# Patient Record
Sex: MALE | Race: WHITE | NOT HISPANIC OR LATINO
[De-identification: names, ages, dates, MRNs, and addresses within clinical notes are randomized per-mention and may not be internally consistent; named-entity substitution may affect disease eponyms.]

---

## 2017-12-28 ENCOUNTER — RX RENEWAL (OUTPATIENT)
Age: 82
End: 2017-12-28

## 2018-04-03 ENCOUNTER — LABORATORY RESULT (OUTPATIENT)
Age: 83
End: 2018-04-03

## 2018-04-04 ENCOUNTER — APPOINTMENT (OUTPATIENT)
Dept: CARDIOLOGY | Facility: CLINIC | Age: 83
End: 2018-04-04
Payer: MEDICARE

## 2018-04-04 ENCOUNTER — NON-APPOINTMENT (OUTPATIENT)
Age: 83
End: 2018-04-04

## 2018-04-04 VITALS
WEIGHT: 174 LBS | RESPIRATION RATE: 16 BRPM | BODY MASS INDEX: 27.97 KG/M2 | HEIGHT: 66 IN | DIASTOLIC BLOOD PRESSURE: 86 MMHG | SYSTOLIC BLOOD PRESSURE: 135 MMHG | HEART RATE: 58 BPM

## 2018-04-04 PROCEDURE — 93306 TTE W/DOPPLER COMPLETE: CPT

## 2018-04-04 PROCEDURE — 93000 ELECTROCARDIOGRAM COMPLETE: CPT

## 2018-04-04 PROCEDURE — 99214 OFFICE O/P EST MOD 30 MIN: CPT

## 2018-04-24 ENCOUNTER — RX RENEWAL (OUTPATIENT)
Age: 83
End: 2018-04-24

## 2018-05-02 ENCOUNTER — APPOINTMENT (OUTPATIENT)
Dept: ELECTROPHYSIOLOGY | Facility: CLINIC | Age: 83
End: 2018-05-02
Payer: MEDICARE

## 2018-05-02 ENCOUNTER — NON-APPOINTMENT (OUTPATIENT)
Age: 83
End: 2018-05-02

## 2018-05-02 VITALS — DIASTOLIC BLOOD PRESSURE: 71 MMHG | SYSTOLIC BLOOD PRESSURE: 150 MMHG | OXYGEN SATURATION: 98 % | HEART RATE: 48 BPM

## 2018-05-02 PROCEDURE — 99215 OFFICE O/P EST HI 40 MIN: CPT

## 2018-05-02 PROCEDURE — 93000 ELECTROCARDIOGRAM COMPLETE: CPT

## 2018-05-02 RX ORDER — RIVAROXABAN 15 MG/1
15 TABLET, FILM COATED ORAL
Qty: 90 | Refills: 1 | Status: DISCONTINUED | COMMUNITY
Start: 2017-12-28 | End: 2018-05-02

## 2018-05-07 ENCOUNTER — RX RENEWAL (OUTPATIENT)
Age: 83
End: 2018-05-07

## 2018-06-20 ENCOUNTER — RX RENEWAL (OUTPATIENT)
Age: 83
End: 2018-06-20

## 2018-07-03 ENCOUNTER — RX RENEWAL (OUTPATIENT)
Age: 83
End: 2018-07-03

## 2018-07-13 ENCOUNTER — APPOINTMENT (OUTPATIENT)
Dept: CARDIOLOGY | Facility: CLINIC | Age: 83
End: 2018-07-13
Payer: MEDICARE

## 2018-07-13 VITALS
HEART RATE: 66 BPM | BODY MASS INDEX: 27 KG/M2 | WEIGHT: 168 LBS | SYSTOLIC BLOOD PRESSURE: 126 MMHG | HEIGHT: 66 IN | RESPIRATION RATE: 16 BRPM | DIASTOLIC BLOOD PRESSURE: 79 MMHG

## 2018-07-13 PROCEDURE — 99214 OFFICE O/P EST MOD 30 MIN: CPT

## 2018-07-30 ENCOUNTER — APPOINTMENT (OUTPATIENT)
Dept: CARDIOLOGY | Facility: CLINIC | Age: 83
End: 2018-07-30

## 2018-07-30 ENCOUNTER — APPOINTMENT (OUTPATIENT)
Dept: CARDIOLOGY | Facility: CLINIC | Age: 83
End: 2018-07-30
Payer: MEDICARE

## 2018-07-30 PROCEDURE — 93224 XTRNL ECG REC UP TO 48 HRS: CPT

## 2018-08-03 ENCOUNTER — NON-APPOINTMENT (OUTPATIENT)
Age: 83
End: 2018-08-03

## 2018-08-20 ENCOUNTER — EMERGENCY (EMERGENCY)
Facility: HOSPITAL | Age: 83
LOS: 1 days | Discharge: HOME | End: 2018-08-20
Attending: EMERGENCY MEDICINE
Payer: MEDICARE

## 2018-08-20 VITALS
OXYGEN SATURATION: 97 % | HEART RATE: 63 BPM | SYSTOLIC BLOOD PRESSURE: 150 MMHG | RESPIRATION RATE: 15 BRPM | DIASTOLIC BLOOD PRESSURE: 61 MMHG | TEMPERATURE: 98 F

## 2018-08-20 VITALS
RESPIRATION RATE: 16 BRPM | HEIGHT: 68 IN | DIASTOLIC BLOOD PRESSURE: 76 MMHG | TEMPERATURE: 100 F | OXYGEN SATURATION: 94 % | HEART RATE: 56 BPM | SYSTOLIC BLOOD PRESSURE: 154 MMHG | WEIGHT: 171.96 LBS

## 2018-08-20 DIAGNOSIS — Z98.89 OTHER SPECIFIED POSTPROCEDURAL STATES: Chronic | ICD-10-CM

## 2018-08-20 LAB
ALBUMIN SERPL ELPH-MCNC: 3 G/DL — LOW (ref 3.3–5)
ALP SERPL-CCNC: 82 U/L — SIGNIFICANT CHANGE UP (ref 40–120)
ALT FLD-CCNC: 31 U/L — SIGNIFICANT CHANGE UP (ref 12–78)
ANION GAP SERPL CALC-SCNC: 8 MMOL/L — SIGNIFICANT CHANGE UP (ref 5–17)
APPEARANCE UR: CLEAR — SIGNIFICANT CHANGE UP
AST SERPL-CCNC: 30 U/L — SIGNIFICANT CHANGE UP (ref 15–37)
BACTERIA # UR AUTO: ABNORMAL
BASOPHILS # BLD AUTO: 0 K/UL — SIGNIFICANT CHANGE UP (ref 0–0.2)
BASOPHILS NFR BLD AUTO: 0 % — SIGNIFICANT CHANGE UP (ref 0–2)
BILIRUB SERPL-MCNC: 0.4 MG/DL — SIGNIFICANT CHANGE UP (ref 0.2–1.2)
BILIRUB UR-MCNC: NEGATIVE — SIGNIFICANT CHANGE UP
BUN SERPL-MCNC: 47 MG/DL — HIGH (ref 7–23)
CALCIUM SERPL-MCNC: 8.4 MG/DL — LOW (ref 8.5–10.1)
CHLORIDE SERPL-SCNC: 107 MMOL/L — SIGNIFICANT CHANGE UP (ref 96–108)
CK MB BLD-MCNC: 1.2 % — SIGNIFICANT CHANGE UP (ref 0–3.5)
CK MB BLD-MCNC: 1.8 % — SIGNIFICANT CHANGE UP (ref 0–3.5)
CK MB CFR SERPL CALC: 2 NG/ML — SIGNIFICANT CHANGE UP (ref 0–3.6)
CK MB CFR SERPL CALC: 2.4 NG/ML — SIGNIFICANT CHANGE UP (ref 0–3.6)
CK SERPL-CCNC: 134 U/L — SIGNIFICANT CHANGE UP (ref 26–308)
CK SERPL-CCNC: 170 U/L — SIGNIFICANT CHANGE UP (ref 26–308)
CO2 SERPL-SCNC: 25 MMOL/L — SIGNIFICANT CHANGE UP (ref 22–31)
COLOR SPEC: YELLOW — SIGNIFICANT CHANGE UP
CREAT SERPL-MCNC: 2.5 MG/DL — HIGH (ref 0.5–1.3)
DIFF PNL FLD: ABNORMAL
EOSINOPHIL # BLD AUTO: 0 K/UL — SIGNIFICANT CHANGE UP (ref 0–0.5)
EOSINOPHIL NFR BLD AUTO: 0 % — SIGNIFICANT CHANGE UP (ref 0–6)
EPI CELLS # UR: SIGNIFICANT CHANGE UP
GLUCOSE SERPL-MCNC: 165 MG/DL — HIGH (ref 70–99)
GLUCOSE UR QL: NEGATIVE — SIGNIFICANT CHANGE UP
HCT VFR BLD CALC: 28.2 % — LOW (ref 39–50)
HGB BLD-MCNC: 9.5 G/DL — LOW (ref 13–17)
KETONES UR-MCNC: NEGATIVE — SIGNIFICANT CHANGE UP
LACTATE SERPL-SCNC: 0.9 MMOL/L — SIGNIFICANT CHANGE UP (ref 0.7–2)
LEUKOCYTE ESTERASE UR-ACNC: ABNORMAL
LYMPHOCYTES # BLD AUTO: 2.05 K/UL — SIGNIFICANT CHANGE UP (ref 1–3.3)
LYMPHOCYTES # BLD AUTO: 30 % — SIGNIFICANT CHANGE UP (ref 13–44)
MANUAL SMEAR VERIFICATION: SIGNIFICANT CHANGE UP
MCHC RBC-ENTMCNC: 28.4 PG — SIGNIFICANT CHANGE UP (ref 27–34)
MCHC RBC-ENTMCNC: 33.7 GM/DL — SIGNIFICANT CHANGE UP (ref 32–36)
MCV RBC AUTO: 84.2 FL — SIGNIFICANT CHANGE UP (ref 80–100)
MONOCYTES # BLD AUTO: 0.07 K/UL — SIGNIFICANT CHANGE UP (ref 0–0.9)
MONOCYTES NFR BLD AUTO: 1 % — LOW (ref 2–14)
NEUTROPHILS # BLD AUTO: 4.72 K/UL — SIGNIFICANT CHANGE UP (ref 1.8–7.4)
NEUTROPHILS NFR BLD AUTO: 66 % — SIGNIFICANT CHANGE UP (ref 43–77)
NEUTS BAND # BLD: 3 % — SIGNIFICANT CHANGE UP (ref 0–8)
NITRITE UR-MCNC: NEGATIVE — SIGNIFICANT CHANGE UP
NRBC # BLD: 0 — SIGNIFICANT CHANGE UP
NRBC # BLD: SIGNIFICANT CHANGE UP /100 WBCS (ref 0–0)
PH UR: 6 — SIGNIFICANT CHANGE UP (ref 5–8)
PLAT MORPH BLD: NORMAL — SIGNIFICANT CHANGE UP
PLATELET # BLD AUTO: 146 K/UL — LOW (ref 150–400)
POTASSIUM SERPL-MCNC: 4.9 MMOL/L — SIGNIFICANT CHANGE UP (ref 3.5–5.3)
POTASSIUM SERPL-SCNC: 4.9 MMOL/L — SIGNIFICANT CHANGE UP (ref 3.5–5.3)
PROT SERPL-MCNC: 6.8 G/DL — SIGNIFICANT CHANGE UP (ref 6–8.3)
PROT UR-MCNC: 75 MG/DL
RAPID RVP RESULT: SIGNIFICANT CHANGE UP
RBC # BLD: 3.35 M/UL — LOW (ref 4.2–5.8)
RBC # FLD: 14.6 % — HIGH (ref 10.3–14.5)
RBC BLD AUTO: SIGNIFICANT CHANGE UP
RBC CASTS # UR COMP ASSIST: SIGNIFICANT CHANGE UP /HPF (ref 0–4)
SODIUM SERPL-SCNC: 140 MMOL/L — SIGNIFICANT CHANGE UP (ref 135–145)
SP GR SPEC: 1 — LOW (ref 1.01–1.02)
TROPONIN I SERPL-MCNC: 0.02 NG/ML — SIGNIFICANT CHANGE UP (ref 0.01–0.04)
TROPONIN I SERPL-MCNC: <.015 NG/ML — SIGNIFICANT CHANGE UP (ref 0.01–0.04)
UROBILINOGEN FLD QL: NEGATIVE — SIGNIFICANT CHANGE UP
WBC # BLD: 6.84 K/UL — SIGNIFICANT CHANGE UP (ref 3.8–10.5)
WBC # FLD AUTO: 6.84 K/UL — SIGNIFICANT CHANGE UP (ref 3.8–10.5)
WBC UR QL: SIGNIFICANT CHANGE UP

## 2018-08-20 PROCEDURE — 99285 EMERGENCY DEPT VISIT HI MDM: CPT

## 2018-08-20 PROCEDURE — 99285 EMERGENCY DEPT VISIT HI MDM: CPT | Mod: 25

## 2018-08-20 PROCEDURE — 71045 X-RAY EXAM CHEST 1 VIEW: CPT | Mod: 26

## 2018-08-20 RX ORDER — SODIUM CHLORIDE 9 MG/ML
1000 INJECTION INTRAMUSCULAR; INTRAVENOUS; SUBCUTANEOUS ONCE
Qty: 0 | Refills: 0 | Status: COMPLETED | OUTPATIENT
Start: 2018-08-20 | End: 2018-08-20

## 2018-08-20 RX ADMIN — SODIUM CHLORIDE 1000 MILLILITER(S): 9 INJECTION INTRAMUSCULAR; INTRAVENOUS; SUBCUTANEOUS at 01:58

## 2018-08-20 NOTE — ED ADULT NURSE REASSESSMENT NOTE - NS ED NURSE REASSESS COMMENT FT1
pt verbalizes no pain at present CE drawned and sent to lab vital signs stable awaiting consult and lab result

## 2018-08-20 NOTE — CONSULT NOTE ADULT - ASSESSMENT
Patient is a 82y old  Male who presents with a chief complaint of chest discomfort     Doubt ACS, pain likely pleuritic vs musculoskeletal.    - No clear evidence of acute ischemia, trops negative x 2. will f/u third set, pt asymptomatic  - No evidence of volume overload  - No acute changes on EKG compared to previous. 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

## 2018-08-20 NOTE — ED ADULT NURSE NOTE - OBJECTIVE STATEMENT
received pt stable c/o chest disconfort  pt evaluted and placed on cardiac monitor ekg  done and reviewed blood work drawned and sent to lab xray done pt took asa at home

## 2018-08-20 NOTE — ED PROVIDER NOTE - CPE EDP GASTRO NORM
Group Topic: Monitoring Safety and Relapse    Start Time: 5:30 PM  End Time: 6:30 PM    Number in attendance: 6       Safety Concerns: None  Types of Safety Concerns: None         normal...

## 2018-08-20 NOTE — ED ADULT NURSE NOTE - PMH
ASHD (Arteriosclerotic Heart Disease)    BPH (Benign Prostatic Hypertrophy)    Diabetes Mellitus Type II    GERD (Gastroesophageal Reflux Disease)    History of Hypothyroidism    History of Spinal Stenosis    Hypercholesterolemia    BECK on CPAP    Osteoarthritis    Prostate cancer

## 2018-08-20 NOTE — ED PROVIDER NOTE - OBJECTIVE STATEMENT
83yo male bib ems with chest pain tonite. pt was sitting in bed and had sudden onset of sharp, left sided chest pain, intermittent, non radiating, no sob, no cough, fever, chills, pt has been on ceftin for a week for a UTI. pt is chest pain free at this time

## 2018-08-20 NOTE — CONSULT NOTE ADULT - SUBJECTIVE AND OBJECTIVE BOX
Patient is a 82y old  Male who presents with a chief complaint of chest discomfort     HPI: 81 yo M with pmh of ASHD with 2 stent placement (last ), HLD, DM II, BECK, prostate CA, hypothyroid, spinal stenosis presents with left sided chest pain at 11pm last night. The pain was localized to his left chest and arm without any radiation. Patient denies any excessive activity or trauma at symptom onset. No history of similar pain in past.  Pain was intermittent and intitally started when patient was lying in bed trying to sleep lasting for about 2 minutes each episode until it completley resolved 3 hours later. Denies any current chest pain, palpitations, diaphoresis, left arm pain, syncope, near syncope. Patient follows cardiology Dr. Kenton Rodriguez who he last saw 2 weeks ago for frequent falls. He was placed on a holter monitior which has been negative per patients wife. Patient reports having a echo and stress test done in the past but does not recall when or where. Of note, patient was recently admitted to [facility] on 2018 for frequent falls and leg weakness. He was placed on telemetry and found to have 1st degree AV block and sinus césar. He was seen by cardiology and neuro and had MRI of spine done which revealed DJD and spinal stenosis, for which patient is being followed as outpatient. Patient is able to walk 1/2 a city block at baseline.      PAST MEDICAL & SURGICAL HISTORY:  BECK on CPAP  Prostate cancer  History of Hypothyroidism  GERD (Gastroesophageal Reflux Disease)  Osteoarthritis  BPH (Benign Prostatic Hypertrophy)  ASHD (Arteriosclerotic Heart Disease)  Diabetes Mellitus Type II  History of Spinal Stenosis  Hypercholesterolemia  S/P tonsillectomy  Central Spinal Stenosis: x stop procedure done  S/P Cataract Surgery: b/l eyes            ECHO  FINDINGS:    Home Medications:  amLODIPine 5 mg oral tablet: 1 tab(s) orally once a day (20 Aug 2018 00:49)  Aspirin Low Dose 81 mg oral delayed release tablet: 1 tab(s) orally once a day (20 Aug 2018 00:49)  cefpodoxime 200 mg oral tablet: 1 tab(s) orally every 12 hours (20 Aug 2018 00:49)  Crestor 20 mg oral tablet: 1 tab(s) orally once a day (at bedtime) (20 Aug 2018 00:49)  donepezil 10 mg oral tablet: 1 tab(s) orally once a day (at bedtime) (20 Aug 2018 00:49)  entergam: 5 milligram(s) orally once a day (20 Aug 2018 00:49)  famotidine 20 mg oral tablet: 1 tab(s) orally once a day (20 Aug 2018 00:49)  ferrous sulfate: 65 milligram(s) orally once a day (20 Aug 2018 00:49)  irbesartan 75 mg oral tablet: 1 tab(s) orally once a day (20 Aug 2018 00:49)  Lantus 100 units/mL subcutaneous solution: 18 unit(s) subcutaneous (20 Aug 2018 00:49)  memantine 10 mg oral tablet: 1 tab(s) orally 2 times a day (20 Aug 2018 00:49)  metoprolol: 12.5 milligram(s) orally 2 times a day (20 Aug 2018 00:49)  multivitamin:  (20 Aug 2018 00:49)  Rapaflo 8 mg oral capsule: 1 cap(s) orally once a day (20 Aug 2018 00:49)  repaglinide 2 mg oral tablet: 1 tab(s) orally 2 times a day (20 Aug 2018 00:49)  sodium bicarbonate 650 mg oral tablet: 1 tab(s) orally 2 times a day (20 Aug 2018 00:49)  Synthroid 100 mcg (0.1 mg) oral tablet: 1 tab(s) orally once a day (20 Aug 2018 00:49)  traZODone: 25 milligram(s) orally once a day (20 Aug 2018 00:49)  venlafaxine 75 mg oral capsule, extended release: 1 cap(s) orally once a day (20 Aug 2018 00:49)  VESIcare 5 mg oral tablet: 1 tab(s) orally once a day (20 Aug 2018 00:49)  vitamin d: 1000 international unit(s) orally once a day (20 Aug 2018 00:49)  Xarelto 15 mg oral tablet: 1 tab(s) orally once a day (in the evening) (20 Aug 2018 00:49)    MEDICATIONS  (STANDING):    MEDICATIONS  (PRN):      FAMILY HISTORY:  Family history of diabetes mellitus type II (Sibling)  Family history of heart attack father  Family history of CAD sister      REVIEW OF SYSTEMS      Constitutional: admits memory loss, denies fever, chills, diaphoresis   HEENT: denies blurry vision, difficulty hearing  Respiratory: denies SOB, AMBROSE, cough, sputum production, wheezing  Cardiovascular: denies chest pain, palpitations, SOB, dyspnea, PND, orthopnea, near syncope, syncope, or lower extremity edema.  Gastrointestinal: denies nausea, vomiting, diarrhea, constipation, abdominal pain   Genitourinary: denies dysuria, frequency, urgency, hematuria   Skin/Breast: denies rash, itching  Musculoskeletal: admits joint pain  Neurologic: denies headache, weakness, dizziness  Hematology/Oncology: denies bruising, tender or enlarged lymph nodes   ROS negative except as noted above    Allergic/Immunologic:	  Allergies    No Known Allergies    Intolerances      SOCIAL HISTORY:  Denies tobacco, drug use. Social alcohol use    Vital Signs Last 24 Hrs  T(C): 37.1 (20 Aug 2018 06:14), Max: 37.8 (20 Aug 2018 00:35)  T(F): 98.7 (20 Aug 2018 06:14), Max: 100.1 (20 Aug 2018 00:35)  HR: 60 (20 Aug 2018 06:14) (56 - 60)  BP: 150/74 (20 Aug 2018 06:14) (145/74 - 154/76)  BP(mean): --  RR: 14 (20 Aug 2018 06:14) (14 - 16)  SpO2: 96% (20 Aug 2018 06:14) (94% - 97%)    PHYSICAL EXAM:      Constitutional:  Physical Exam:  General:NAD, lying comfortably in bed  HEENT: NCAT, PERRLA, EOMI bl, dry mucous membranes   Neurology: A&Ox3, nonfocal, CN II-XII grossly intact, sensation intact  Respiratory: CTA B/L, No W/R/R  CV: RRR, +S1/S2, no murmurs, rubs or gallops  Abdominal: Soft, NT, ND +BSx4  Extremities: No C/C/E, + peripheral pulses  MSK: Normal ROM, no joint erythema or warmth, no joint swelling   Skin: warm, dry, normal color    ECG:    I&O's Detail      LABS:                        9.5    6.84  )-----------( 146      ( 20 Aug 2018 01:05 )             28.2     08-20    140  |  107  |  47<H>  ----------------------------<  165<H>  4.9   |  25  |  2.50<H>    Ca    8.4<L>      20 Aug 2018 01:05    TPro  6.8  /  Alb  3.0<L>  /  TBili  0.4  /  DBili  x   /  AST  30  /  ALT  31  /  AlkPhos  82  08-20    CARDIAC MARKERS ( 20 Aug 2018 06:12 )  .019 ng/mL / x     / 134 U/L / x     / 2.4 ng/mL  CARDIAC MARKERS ( 20 Aug 2018 01:05 )  <.015 ng/mL / x     / 170 U/L / x     / 2.0 ng/mL        Urinalysis Basic - ( 20 Aug 2018 01:37 )    Color: Yellow / Appearance: Clear / S.005 / pH: x  Gluc: x / Ketone: Negative  / Bili: Negative / Urobili: Negative   Blood: x / Protein: 75 mg/dL / Nitrite: Negative   Leuk Esterase: Trace / RBC: 0-2 /HPF / WBC 0-2   Sq Epi: x / Non Sq Epi: Occasional / Bacteria: Occasional      I&O's Summary    BNP  RADIOLOGY & ADDITIONAL STUDIES: Patient is a 82y old  Male who presents with a chief complaint of chest discomfort     HPI: 81 yo M with pmh of ASHD with 2 stent placement (last ), Afib on Xarelto. HLD, DM II, BECK, prostate CA, hypothyroid, spinal stenosis presents with left sided chest pain at 11pm last night. The pain was localized to his left chest and arm without any radiation. Patient denies any excessive activity or trauma at symptom onset. No history of similar pain in past.  Pain was intermittent and intitally started when patient was lying in bed trying to sleep lasting for about 2 minutes each episode until it completley resolved 3 hours later. Denies any current chest pain, palpitations, diaphoresis, left arm pain, syncope, near syncope. Patient follows cardiology Dr. Kenton Rodriguez who he last saw 2 weeks ago for frequent falls. He was placed on a holter monitior which has been negative per patients wife. Patient reports having a echo and stress test done in the past but does not recall when or where. Of note, patient was recently admitted to [facility] on 2018 for frequent falls and leg weakness. He was placed on telemetry and found to have 1st degree AV block and sinus césar. He was seen by cardiology and neuro and had MRI of spine done which revealed DJD and spinal stenosis, for which patient is being followed as outpatient. Patient is able to walk 1/2 a city block at baseline.      PAST MEDICAL & SURGICAL HISTORY:  BECK on CPAP  Prostate cancer  History of Hypothyroidism  GERD (Gastroesophageal Reflux Disease)  Osteoarthritis  BPH (Benign Prostatic Hypertrophy)  ASHD (Arteriosclerotic Heart Disease)  Diabetes Mellitus Type II  History of Spinal Stenosis  Hypercholesterolemia  S/P tonsillectomy  Central Spinal Stenosis: x stop procedure done  S/P Cataract Surgery: b/l eyes            ECHO  FINDINGS:    Home Medications:  amLODIPine 5 mg oral tablet: 1 tab(s) orally once a day (20 Aug 2018 00:49)  Aspirin Low Dose 81 mg oral delayed release tablet: 1 tab(s) orally once a day (20 Aug 2018 00:49)  cefpodoxime 200 mg oral tablet: 1 tab(s) orally every 12 hours (20 Aug 2018 00:49)  Crestor 20 mg oral tablet: 1 tab(s) orally once a day (at bedtime) (20 Aug 2018 00:49)  donepezil 10 mg oral tablet: 1 tab(s) orally once a day (at bedtime) (20 Aug 2018 00:49)  entergam: 5 milligram(s) orally once a day (20 Aug 2018 00:49)  famotidine 20 mg oral tablet: 1 tab(s) orally once a day (20 Aug 2018 00:49)  ferrous sulfate: 65 milligram(s) orally once a day (20 Aug 2018 00:49)  irbesartan 75 mg oral tablet: 1 tab(s) orally once a day (20 Aug 2018 00:49)  Lantus 100 units/mL subcutaneous solution: 18 unit(s) subcutaneous (20 Aug 2018 00:49)  memantine 10 mg oral tablet: 1 tab(s) orally 2 times a day (20 Aug 2018 00:49)  metoprolol: 12.5 milligram(s) orally 2 times a day (20 Aug 2018 00:49)  multivitamin:  (20 Aug 2018 00:49)  Rapaflo 8 mg oral capsule: 1 cap(s) orally once a day (20 Aug 2018 00:49)  repaglinide 2 mg oral tablet: 1 tab(s) orally 2 times a day (20 Aug 2018 00:49)  sodium bicarbonate 650 mg oral tablet: 1 tab(s) orally 2 times a day (20 Aug 2018 00:49)  Synthroid 100 mcg (0.1 mg) oral tablet: 1 tab(s) orally once a day (20 Aug 2018 00:49)  traZODone: 25 milligram(s) orally once a day (20 Aug 2018 00:49)  venlafaxine 75 mg oral capsule, extended release: 1 cap(s) orally once a day (20 Aug 2018 00:49)  VESIcare 5 mg oral tablet: 1 tab(s) orally once a day (20 Aug 2018 00:49)  vitamin d: 1000 international unit(s) orally once a day (20 Aug 2018 00:49)  Xarelto 15 mg oral tablet: 1 tab(s) orally once a day (in the evening) (20 Aug 2018 00:49)    MEDICATIONS  (STANDING):    MEDICATIONS  (PRN):      FAMILY HISTORY:  Family history of diabetes mellitus type II (Sibling)  Family history of heart attack father  Family history of CAD sister      REVIEW OF SYSTEMS      Constitutional: admits memory loss, denies fever, chills, diaphoresis   HEENT: denies blurry vision, difficulty hearing  Respiratory: denies SOB, AMBROSE, cough, sputum production, wheezing  Cardiovascular: denies chest pain, palpitations, SOB, dyspnea, PND, orthopnea, near syncope, syncope, or lower extremity edema.  Gastrointestinal: denies nausea, vomiting, diarrhea, constipation, abdominal pain   Genitourinary: denies dysuria, frequency, urgency, hematuria   Skin/Breast: denies rash, itching  Musculoskeletal: admits joint pain  Neurologic: denies headache, weakness, dizziness  Hematology/Oncology: denies bruising, tender or enlarged lymph nodes   ROS negative except as noted above    Allergic/Immunologic:	  Allergies    No Known Allergies    Intolerances      SOCIAL HISTORY:  Denies tobacco, drug use. Social alcohol use    Vital Signs Last 24 Hrs  T(C): 37.1 (20 Aug 2018 06:14), Max: 37.8 (20 Aug 2018 00:35)  T(F): 98.7 (20 Aug 2018 06:14), Max: 100.1 (20 Aug 2018 00:35)  HR: 60 (20 Aug 2018 06:14) (56 - 60)  BP: 150/74 (20 Aug 2018 06:14) (145/74 - 154/76)  BP(mean): --  RR: 14 (20 Aug 2018 06:14) (14 - 16)  SpO2: 96% (20 Aug 2018 06:14) (94% - 97%)    PHYSICAL EXAM:      Constitutional:  Physical Exam:  General:NAD, lying comfortably in bed  HEENT: NCAT, PERRLA, EOMI bl, dry mucous membranes   Neurology: A&Ox3, nonfocal, CN II-XII grossly intact, sensation intact  Respiratory: CTA B/L, No W/R/R  CV: RRR, +S1/S2, no murmurs, rubs or gallops  Abdominal: Soft, NT, ND +BSx4  Extremities: No C/C/E, + peripheral pulses  MSK: Normal ROM, no joint erythema or warmth, no joint swelling   Skin: warm, dry, normal color    ECG:    I&O's Detail      LABS:                        9.5    6.84  )-----------( 146      ( 20 Aug 2018 01:05 )             28.2     08-20    140  |  107  |  47<H>  ----------------------------<  165<H>  4.9   |  25  |  2.50<H>    Ca    8.4<L>      20 Aug 2018 01:05    TPro  6.8  /  Alb  3.0<L>  /  TBili  0.4  /  DBili  x   /  AST  30  /  ALT  31  /  AlkPhos  82  08-20    CARDIAC MARKERS ( 20 Aug 2018 06:12 )  .019 ng/mL / x     / 134 U/L / x     / 2.4 ng/mL  CARDIAC MARKERS ( 20 Aug 2018 01:05 )  <.015 ng/mL / x     / 170 U/L / x     / 2.0 ng/mL        Urinalysis Basic - ( 20 Aug 2018 01:37 )    Color: Yellow / Appearance: Clear / S.005 / pH: x  Gluc: x / Ketone: Negative  / Bili: Negative / Urobili: Negative   Blood: x / Protein: 75 mg/dL / Nitrite: Negative   Leuk Esterase: Trace / RBC: 0-2 /HPF / WBC 0-2   Sq Epi: x / Non Sq Epi: Occasional / Bacteria: Occasional      I&O's Summary    BNP  RADIOLOGY & ADDITIONAL STUDIES:

## 2018-08-20 NOTE — ED ADULT NURSE NOTE - NSIMPLEMENTINTERV_GEN_ALL_ED
Implemented All Universal Safety Interventions:  [place] to call system. Call bell, personal items and telephone within reach. Instruct patient to call for assistance. Room bathroom lighting operational. Non-slip footwear when patient is off stretcher. Physically safe environment: no spills, clutter or unnecessary equipment. Stretcher in lowest position, wheels locked, appropriate side rails in place.

## 2018-08-21 LAB
CULTURE RESULTS: NO GROWTH — SIGNIFICANT CHANGE UP
SPECIMEN SOURCE: SIGNIFICANT CHANGE UP

## 2018-08-22 PROCEDURE — 87086 URINE CULTURE/COLONY COUNT: CPT

## 2018-08-22 PROCEDURE — 99285 EMERGENCY DEPT VISIT HI MDM: CPT | Mod: 25

## 2018-08-22 PROCEDURE — 80053 COMPREHEN METABOLIC PANEL: CPT

## 2018-08-22 PROCEDURE — 81001 URINALYSIS AUTO W/SCOPE: CPT

## 2018-08-22 PROCEDURE — 93005 ELECTROCARDIOGRAM TRACING: CPT

## 2018-08-22 PROCEDURE — 87581 M.PNEUMON DNA AMP PROBE: CPT

## 2018-08-22 PROCEDURE — 87798 DETECT AGENT NOS DNA AMP: CPT

## 2018-08-22 PROCEDURE — 87633 RESP VIRUS 12-25 TARGETS: CPT

## 2018-08-22 PROCEDURE — 82550 ASSAY OF CK (CPK): CPT

## 2018-08-22 PROCEDURE — 82553 CREATINE MB FRACTION: CPT

## 2018-08-22 PROCEDURE — 83605 ASSAY OF LACTIC ACID: CPT

## 2018-08-22 PROCEDURE — 87486 CHLMYD PNEUM DNA AMP PROBE: CPT

## 2018-08-22 PROCEDURE — 84484 ASSAY OF TROPONIN QUANT: CPT

## 2018-08-22 PROCEDURE — 36415 COLL VENOUS BLD VENIPUNCTURE: CPT

## 2018-08-22 PROCEDURE — 71045 X-RAY EXAM CHEST 1 VIEW: CPT

## 2018-08-22 PROCEDURE — 85027 COMPLETE CBC AUTOMATED: CPT

## 2018-08-27 ENCOUNTER — NON-APPOINTMENT (OUTPATIENT)
Age: 83
End: 2018-08-27

## 2018-08-27 ENCOUNTER — APPOINTMENT (OUTPATIENT)
Dept: CARDIOLOGY | Facility: CLINIC | Age: 83
End: 2018-08-27
Payer: MEDICARE

## 2018-08-27 VITALS
WEIGHT: 163 LBS | DIASTOLIC BLOOD PRESSURE: 82 MMHG | HEART RATE: 70 BPM | SYSTOLIC BLOOD PRESSURE: 128 MMHG | BODY MASS INDEX: 27.16 KG/M2 | HEIGHT: 65 IN | RESPIRATION RATE: 15 BRPM

## 2018-08-27 PROCEDURE — 99214 OFFICE O/P EST MOD 30 MIN: CPT | Mod: 25

## 2018-08-27 PROCEDURE — 93000 ELECTROCARDIOGRAM COMPLETE: CPT

## 2018-08-29 ENCOUNTER — OUTPATIENT (OUTPATIENT)
Dept: OUTPATIENT SERVICES | Facility: HOSPITAL | Age: 83
LOS: 1 days | End: 2018-08-29
Payer: MEDICARE

## 2018-08-29 VITALS
TEMPERATURE: 98 F | HEART RATE: 59 BPM | SYSTOLIC BLOOD PRESSURE: 176 MMHG | WEIGHT: 164.91 LBS | HEIGHT: 65 IN | DIASTOLIC BLOOD PRESSURE: 76 MMHG | OXYGEN SATURATION: 99 % | RESPIRATION RATE: 16 BRPM

## 2018-08-29 DIAGNOSIS — Z98.89 OTHER SPECIFIED POSTPROCEDURAL STATES: Chronic | ICD-10-CM

## 2018-08-29 DIAGNOSIS — I25.10 ATHEROSCLEROTIC HEART DISEASE OF NATIVE CORONARY ARTERY WITHOUT ANGINA PECTORIS: ICD-10-CM

## 2018-08-29 LAB
ALBUMIN SERPL ELPH-MCNC: 4.3 G/DL — SIGNIFICANT CHANGE UP (ref 3.3–5)
ALP SERPL-CCNC: 88 U/L — SIGNIFICANT CHANGE UP (ref 40–120)
ALT FLD-CCNC: 17 U/L — SIGNIFICANT CHANGE UP (ref 10–45)
ANION GAP SERPL CALC-SCNC: 14 MMOL/L — SIGNIFICANT CHANGE UP (ref 5–17)
AST SERPL-CCNC: 17 U/L — SIGNIFICANT CHANGE UP (ref 10–40)
BILIRUB SERPL-MCNC: 0.2 MG/DL — SIGNIFICANT CHANGE UP (ref 0.2–1.2)
BUN SERPL-MCNC: 52 MG/DL — HIGH (ref 7–23)
CALCIUM SERPL-MCNC: 9.1 MG/DL — SIGNIFICANT CHANGE UP (ref 8.4–10.5)
CHLORIDE SERPL-SCNC: 104 MMOL/L — SIGNIFICANT CHANGE UP (ref 96–108)
CO2 SERPL-SCNC: 22 MMOL/L — SIGNIFICANT CHANGE UP (ref 22–31)
CREAT SERPL-MCNC: 2.88 MG/DL — HIGH (ref 0.5–1.3)
GLUCOSE SERPL-MCNC: 163 MG/DL — HIGH (ref 70–99)
HCT VFR BLD CALC: 30.9 % — LOW (ref 39–50)
HGB BLD-MCNC: 10.1 G/DL — LOW (ref 13–17)
MCHC RBC-ENTMCNC: 28.1 PG — SIGNIFICANT CHANGE UP (ref 27–34)
MCHC RBC-ENTMCNC: 32.8 GM/DL — SIGNIFICANT CHANGE UP (ref 32–36)
MCV RBC AUTO: 85.9 FL — SIGNIFICANT CHANGE UP (ref 80–100)
PLATELET # BLD AUTO: 319 K/UL — SIGNIFICANT CHANGE UP (ref 150–400)
POTASSIUM SERPL-MCNC: 4.3 MMOL/L — SIGNIFICANT CHANGE UP (ref 3.5–5.3)
POTASSIUM SERPL-SCNC: 4.3 MMOL/L — SIGNIFICANT CHANGE UP (ref 3.5–5.3)
PROT SERPL-MCNC: 7.2 G/DL — SIGNIFICANT CHANGE UP (ref 6–8.3)
RBC # BLD: 3.59 M/UL — LOW (ref 4.2–5.8)
RBC # FLD: 15.5 % — HIGH (ref 10.3–14.5)
SODIUM SERPL-SCNC: 140 MMOL/L — SIGNIFICANT CHANGE UP (ref 135–145)
WBC # BLD: 8.8 K/UL — SIGNIFICANT CHANGE UP (ref 3.8–10.5)
WBC # FLD AUTO: 8.8 K/UL — SIGNIFICANT CHANGE UP (ref 3.8–10.5)

## 2018-08-29 PROCEDURE — C1887: CPT

## 2018-08-29 PROCEDURE — 80053 COMPREHEN METABOLIC PANEL: CPT

## 2018-08-29 PROCEDURE — 93458 L HRT ARTERY/VENTRICLE ANGIO: CPT | Mod: 26

## 2018-08-29 PROCEDURE — 85027 COMPLETE CBC AUTOMATED: CPT

## 2018-08-29 PROCEDURE — 93010 ELECTROCARDIOGRAM REPORT: CPT

## 2018-08-29 PROCEDURE — 93458 L HRT ARTERY/VENTRICLE ANGIO: CPT

## 2018-08-29 PROCEDURE — 93005 ELECTROCARDIOGRAM TRACING: CPT

## 2018-08-29 PROCEDURE — C1894: CPT

## 2018-08-29 PROCEDURE — C1769: CPT

## 2018-08-29 RX ORDER — CEFPODOXIME PROXETIL 100 MG
1 TABLET ORAL
Qty: 0 | Refills: 0 | COMMUNITY

## 2018-08-29 RX ORDER — HYDRALAZINE HCL 50 MG
5 TABLET ORAL ONCE
Qty: 0 | Refills: 0 | Status: COMPLETED | OUTPATIENT
Start: 2018-08-29 | End: 2018-08-29

## 2018-08-29 RX ORDER — SODIUM CHLORIDE 9 MG/ML
500 INJECTION INTRAMUSCULAR; INTRAVENOUS; SUBCUTANEOUS
Qty: 0 | Refills: 0 | Status: DISCONTINUED | OUTPATIENT
Start: 2018-08-29 | End: 2018-09-13

## 2018-08-29 RX ORDER — INSULIN GLARGINE 100 [IU]/ML
18 INJECTION, SOLUTION SUBCUTANEOUS
Qty: 0 | Refills: 0 | COMMUNITY

## 2018-08-29 RX ADMIN — Medication 5 MILLIGRAM(S): at 13:55

## 2018-08-29 NOTE — H&P CARDIOLOGY - PRESSURE ULCER(S)
Detail Level: Simple Note Text (......Xxx Chief Complaint.): This diagnosis correlates with the no Other (Free Text): Continue using topical fluocinonde on body qd & Desonide on chest, ears and face until next visit & bx results

## 2018-08-29 NOTE — H&P CARDIOLOGY - HISTORY OF PRESENT ILLNESS
This is a 84 y/o  male with strong familial history of CAD, mitral regurg,  former smoker, and PMH of HTN, HLD, chronic Afib ( on Xarelto last dose on 8/28 ) DM type 2 ( last A1C unknown, managed by Md ALVARADO Roca), CAD, with prior stent placement ( X2 in 05/2014 ), TIA, CVA  x1 ( 05/2015 unsteady gait- uses walker to ambulate, denies paralysis), CKD ( last creat 2.50 on 8/20/18 , followed by Md ALVARADO Galicia, from [facility] in [place], started on IV NS at 75 cc/hr pre cath), Dementia ( dx 8 years ago), BECK non compliant with CPAP, hypothyroidism.  Presents to Md. Rodriguez with...  Currently denies any chest pain, dyspnea, palpitations, dizziness, syncope, N&V, HA.        PCP - Dr. Damaso Gonzalez    < from: Cardiac Cath Lab - Adult (09.08.16 @ 12:33) >  CORONARY VESSELS: The coronary circulation is right dominant.  LM:   --  Distal left main: There was a 30 % stenosis.  LAD:   --  LAD: Normal. There was no significant restenosis.  CX:   --  Ostial circumflex: There was a 50 % stenosis.  RCA:   --  Proximal RCA: There was a 100 % stenosis.  COMPLICATIONS: There were no complications.  DIAGNOSTIC RECOMMENDATIONS: The patient should continue with the present  medications.  Prepared and signed by  Waqas Roca M.D.    < end of copied text > This is a 82 y/o  male with strong familial history of CAD, mitral regurg,  former smoker, and PMH of HTN, HLD, chronic Afib ( on Xarelto last dose on 8/28 ) DM type 2 ( last A1C unknown, managed by Md ALVARADO Roca), CAD, with prior stent placement ( X2 in 05/2014 ), TIA, CVA  x1 ( 05/2015 unsteady gait- uses walker to ambulate, denies paralysis), CKD ( last creat 2.50 on 8/20/18 , followed by Md ALVARADO Galicia, from [facility] in [place], started on IV NS at 75 cc/hr pre cath), Dementia ( dx 8 years ago), BECK non compliant with CPAP, hypothyroidism.  Presents to Md. Rodriguez with c/c of chest pain x 4 days.  Referred here today for cardiac cath.  Currently denies any chest pain, dyspnea, palpitations, dizziness, syncope, N&V, HA. After speaking to Dr Roca, cath cancelled today because pt took Xarelto last night at 8pm.         PCP - Dr. Damaso Gonzalez    < from: Cardiac Cath Lab - Adult (09.08.16 @ 12:33) >  CORONARY VESSELS: The coronary circulation is right dominant.  LM:   --  Distal left main: There was a 30 % stenosis.  LAD:   --  LAD: Normal. There was no significant restenosis.  CX:   --  Ostial circumflex: There was a 50 % stenosis.  RCA:   --  Proximal RCA: There was a 100 % stenosis.  COMPLICATIONS: There were no complications.  DIAGNOSTIC RECOMMENDATIONS: The patient should continue with the present  medications.  Prepared and signed by  Waqas Roca M.D.    < end of copied text >

## 2018-08-29 NOTE — H&P CARDIOLOGY - PMH
ASHD (Arteriosclerotic Heart Disease)    BPH (Benign Prostatic Hypertrophy)    CKD (chronic kidney disease)    Diabetes Mellitus Type II    GERD (Gastroesophageal Reflux Disease)    History of Hypothyroidism    History of Spinal Stenosis    Hypercholesterolemia    Mitral regurgitation    BECK on CPAP    Osteoarthritis    Prostate cancer

## 2018-08-29 NOTE — H&P CARDIOLOGY - FAMILY HISTORY
Family history of heart attack     Family history of heart attack     Sibling  Still living? No  Family history of heart attack, Age at diagnosis: Age Unknown  Family history of diabetes mellitus type II, Age at diagnosis: Age Unknown

## 2018-09-01 ENCOUNTER — TRANSCRIPTION ENCOUNTER (OUTPATIENT)
Age: 83
End: 2018-09-01

## 2018-09-13 PROBLEM — N18.9 CHRONIC KIDNEY DISEASE, UNSPECIFIED: Chronic | Status: ACTIVE | Noted: 2018-08-29

## 2018-09-13 PROBLEM — I34.0 NONRHEUMATIC MITRAL (VALVE) INSUFFICIENCY: Chronic | Status: ACTIVE | Noted: 2018-08-29

## 2018-09-17 ENCOUNTER — APPOINTMENT (OUTPATIENT)
Dept: CARDIOLOGY | Facility: CLINIC | Age: 83
End: 2018-09-17

## 2018-09-20 ENCOUNTER — OUTPATIENT (OUTPATIENT)
Dept: OUTPATIENT SERVICES | Facility: HOSPITAL | Age: 83
LOS: 1 days | End: 2018-09-20
Payer: MEDICARE

## 2018-09-20 VITALS
HEART RATE: 62 BPM | SYSTOLIC BLOOD PRESSURE: 150 MMHG | HEIGHT: 68 IN | WEIGHT: 166.89 LBS | OXYGEN SATURATION: 98 % | DIASTOLIC BLOOD PRESSURE: 75 MMHG | RESPIRATION RATE: 18 BRPM | TEMPERATURE: 96 F

## 2018-09-20 DIAGNOSIS — M48.061 SPINAL STENOSIS, LUMBAR REGION WITHOUT NEUROGENIC CLAUDICATION: ICD-10-CM

## 2018-09-20 DIAGNOSIS — I25.10 ATHEROSCLEROTIC HEART DISEASE OF NATIVE CORONARY ARTERY WITHOUT ANGINA PECTORIS: ICD-10-CM

## 2018-09-20 DIAGNOSIS — I48.91 UNSPECIFIED ATRIAL FIBRILLATION: ICD-10-CM

## 2018-09-20 DIAGNOSIS — Z98.89 OTHER SPECIFIED POSTPROCEDURAL STATES: Chronic | ICD-10-CM

## 2018-09-20 DIAGNOSIS — Z01.818 ENCOUNTER FOR OTHER PREPROCEDURAL EXAMINATION: ICD-10-CM

## 2018-09-20 LAB
ANION GAP SERPL CALC-SCNC: 13 MMOL/L — SIGNIFICANT CHANGE UP (ref 5–17)
BLD GP AB SCN SERPL QL: NEGATIVE — SIGNIFICANT CHANGE UP
BUN SERPL-MCNC: 55 MG/DL — HIGH (ref 7–23)
CALCIUM SERPL-MCNC: 9.2 MG/DL — SIGNIFICANT CHANGE UP (ref 8.4–10.5)
CHLORIDE SERPL-SCNC: 107 MMOL/L — SIGNIFICANT CHANGE UP (ref 96–108)
CO2 SERPL-SCNC: 20 MMOL/L — LOW (ref 22–31)
CREAT SERPL-MCNC: 2.6 MG/DL — HIGH (ref 0.5–1.3)
GLUCOSE SERPL-MCNC: 129 MG/DL — HIGH (ref 70–99)
HBA1C BLD-MCNC: 6.1 % — HIGH (ref 4–5.6)
HCT VFR BLD CALC: 33 % — LOW (ref 39–50)
HGB BLD-MCNC: 11 G/DL — LOW (ref 13–17)
MCHC RBC-ENTMCNC: 29.1 PG — SIGNIFICANT CHANGE UP (ref 27–34)
MCHC RBC-ENTMCNC: 33.3 GM/DL — SIGNIFICANT CHANGE UP (ref 32–36)
MCV RBC AUTO: 87.3 FL — SIGNIFICANT CHANGE UP (ref 80–100)
PLATELET # BLD AUTO: 208 K/UL — SIGNIFICANT CHANGE UP (ref 150–400)
POTASSIUM SERPL-MCNC: 4.6 MMOL/L — SIGNIFICANT CHANGE UP (ref 3.5–5.3)
POTASSIUM SERPL-SCNC: 4.6 MMOL/L — SIGNIFICANT CHANGE UP (ref 3.5–5.3)
RBC # BLD: 3.78 M/UL — LOW (ref 4.2–5.8)
RBC # FLD: 15.1 % — HIGH (ref 10.3–14.5)
RH IG SCN BLD-IMP: POSITIVE — SIGNIFICANT CHANGE UP
SODIUM SERPL-SCNC: 140 MMOL/L — SIGNIFICANT CHANGE UP (ref 135–145)
WBC # BLD: 7.87 K/UL — SIGNIFICANT CHANGE UP (ref 3.8–10.5)
WBC # FLD AUTO: 7.87 K/UL — SIGNIFICANT CHANGE UP (ref 3.8–10.5)

## 2018-09-20 PROCEDURE — 87641 MR-STAPH DNA AMP PROBE: CPT

## 2018-09-20 PROCEDURE — 86900 BLOOD TYPING SEROLOGIC ABO: CPT

## 2018-09-20 PROCEDURE — G0463: CPT

## 2018-09-20 PROCEDURE — 83036 HEMOGLOBIN GLYCOSYLATED A1C: CPT

## 2018-09-20 PROCEDURE — 86901 BLOOD TYPING SEROLOGIC RH(D): CPT

## 2018-09-20 PROCEDURE — 80048 BASIC METABOLIC PNL TOTAL CA: CPT

## 2018-09-20 PROCEDURE — 86850 RBC ANTIBODY SCREEN: CPT

## 2018-09-20 PROCEDURE — 87640 STAPH A DNA AMP PROBE: CPT

## 2018-09-20 PROCEDURE — 85027 COMPLETE CBC AUTOMATED: CPT

## 2018-09-20 RX ORDER — LIDOCAINE HCL 20 MG/ML
0.2 VIAL (ML) INJECTION ONCE
Qty: 0 | Refills: 0 | Status: DISCONTINUED | OUTPATIENT
Start: 2018-10-03 | End: 2018-10-03

## 2018-09-20 RX ORDER — CEFAZOLIN SODIUM 1 G
2000 VIAL (EA) INJECTION ONCE
Qty: 0 | Refills: 0 | Status: DISCONTINUED | OUTPATIENT
Start: 2018-10-03 | End: 2018-10-03

## 2018-09-20 RX ORDER — SODIUM CHLORIDE 9 MG/ML
3 INJECTION INTRAMUSCULAR; INTRAVENOUS; SUBCUTANEOUS EVERY 8 HOURS
Qty: 0 | Refills: 0 | Status: DISCONTINUED | OUTPATIENT
Start: 2018-10-03 | End: 2018-10-03

## 2018-09-20 NOTE — H&P PST ADULT - PMH
ASHD (Arteriosclerotic Heart Disease)    BPH (Benign Prostatic Hypertrophy)    CKD (chronic kidney disease)    Diabetes Mellitus Type II    GERD (Gastroesophageal Reflux Disease)    History of Hypothyroidism    History of Spinal Stenosis    Hypercholesterolemia    Mitral regurgitation    BECK on CPAP    Osteoarthritis    Prostate cancer ASHD (Arteriosclerotic Heart Disease)    BPH (Benign Prostatic Hypertrophy)    CKD (chronic kidney disease)    Dementia    Diabetes Mellitus Type II    GERD (Gastroesophageal Reflux Disease)    History of Hypothyroidism    History of Spinal Stenosis    Hypercholesterolemia    Mitral regurgitation    BECK on CPAP    Osteoarthritis    Prostate cancer

## 2018-09-20 NOTE — H&P PST ADULT - HISTORY OF PRESENT ILLNESS
84 y/o M PMH CAD, S/P coronary artery stents 2014 82 y/o M PMH BECK, non-compliant with CPAP, CAD, S/P coronary artery stents 2014, abnormal stress test, S/P diagnostic angiogram 8/29/18 without intervention, continuing on current medication regimen, A fib on xarelto, lumbar stenosis, c/o low keiry pain for the past 10 years getting progressively worse.  Presents today for L4-L5 posterior laminectomy, L3-S1 stabilization and fusion. 82 y/o M PMH CKD, BECK, non-compliant with CPAP, CAD, S/P coronary artery stents 2014, abnormal stress test, S/P diagnostic angiogram 8/29/18 without intervention, continuing on current medication regimen, A fib on xarelto, lumbar stenosis, c/o low keiry pain for the past 10 years getting progressively worse.  Presents today for L4-L5 posterior laminectomy, L3-S1 stabilization and fusion.

## 2018-09-21 LAB
MRSA PCR RESULT.: SIGNIFICANT CHANGE UP
S AUREUS DNA NOSE QL NAA+PROBE: DETECTED

## 2018-09-25 DIAGNOSIS — E11.9 TYPE 2 DIABETES MELLITUS WITHOUT COMPLICATIONS: ICD-10-CM

## 2018-09-26 ENCOUNTER — NON-APPOINTMENT (OUTPATIENT)
Age: 83
End: 2018-09-26

## 2018-09-26 ENCOUNTER — APPOINTMENT (OUTPATIENT)
Dept: CARDIOLOGY | Facility: CLINIC | Age: 83
End: 2018-09-26
Payer: MEDICARE

## 2018-09-26 VITALS
WEIGHT: 168 LBS | SYSTOLIC BLOOD PRESSURE: 115 MMHG | HEART RATE: 50 BPM | DIASTOLIC BLOOD PRESSURE: 78 MMHG | RESPIRATION RATE: 15 BRPM | BODY MASS INDEX: 27.99 KG/M2 | HEIGHT: 65 IN

## 2018-09-26 DIAGNOSIS — R06.09 OTHER FORMS OF DYSPNEA: ICD-10-CM

## 2018-09-26 PROCEDURE — 99214 OFFICE O/P EST MOD 30 MIN: CPT

## 2018-09-26 PROCEDURE — 93000 ELECTROCARDIOGRAM COMPLETE: CPT

## 2018-09-26 RX ORDER — BICALUTAMIDE 50 MG/1
50 TABLET ORAL
Refills: 0 | Status: COMPLETED | COMMUNITY
End: 2018-09-26

## 2018-09-26 RX ORDER — FERROUS SULFATE 325(65) MG
325 (65 FE) TABLET ORAL DAILY
Refills: 0 | Status: ACTIVE | COMMUNITY
Start: 2018-09-26

## 2018-09-26 RX ORDER — TRAZODONE HYDROCHLORIDE 50 MG/1
50 TABLET ORAL
Refills: 0 | Status: ACTIVE | COMMUNITY
Start: 2018-09-23

## 2018-10-01 ENCOUNTER — NON-APPOINTMENT (OUTPATIENT)
Age: 83
End: 2018-10-01

## 2018-10-01 ENCOUNTER — APPOINTMENT (OUTPATIENT)
Dept: CARDIOLOGY | Facility: CLINIC | Age: 83
End: 2018-10-01
Payer: MEDICARE

## 2018-10-01 VITALS
DIASTOLIC BLOOD PRESSURE: 80 MMHG | BODY MASS INDEX: 28.16 KG/M2 | WEIGHT: 169 LBS | HEART RATE: 68 BPM | HEIGHT: 65 IN | SYSTOLIC BLOOD PRESSURE: 127 MMHG | RESPIRATION RATE: 15 BRPM

## 2018-10-01 DIAGNOSIS — I77.1 STRICTURE OF ARTERY: ICD-10-CM

## 2018-10-01 DIAGNOSIS — I49.9 CARDIAC ARRHYTHMIA, UNSPECIFIED: ICD-10-CM

## 2018-10-01 DIAGNOSIS — Z01.810 ENCOUNTER FOR PREPROCEDURAL CARDIOVASCULAR EXAMINATION: ICD-10-CM

## 2018-10-01 PROCEDURE — 99214 OFFICE O/P EST MOD 30 MIN: CPT | Mod: PD

## 2018-10-01 PROCEDURE — 93000 ELECTROCARDIOGRAM COMPLETE: CPT | Mod: PD

## 2018-10-02 ENCOUNTER — TRANSCRIPTION ENCOUNTER (OUTPATIENT)
Age: 83
End: 2018-10-02

## 2018-10-03 ENCOUNTER — INPATIENT (INPATIENT)
Facility: HOSPITAL | Age: 83
LOS: 5 days | Discharge: HOME | DRG: 460 | End: 2018-10-09
Attending: NEUROLOGICAL SURGERY | Admitting: NEUROLOGICAL SURGERY
Payer: MEDICARE

## 2018-10-03 ENCOUNTER — RESULT REVIEW (OUTPATIENT)
Age: 83
End: 2018-10-03

## 2018-10-03 VITALS
DIASTOLIC BLOOD PRESSURE: 81 MMHG | WEIGHT: 166.89 LBS | HEART RATE: 67 BPM | HEIGHT: 68 IN | OXYGEN SATURATION: 97 % | RESPIRATION RATE: 18 BRPM | SYSTOLIC BLOOD PRESSURE: 169 MMHG | TEMPERATURE: 99 F

## 2018-10-03 DIAGNOSIS — M48.06 SPINAL STENOSIS, LUMBAR REGION: ICD-10-CM

## 2018-10-03 DIAGNOSIS — Z98.89 OTHER SPECIFIED POSTPROCEDURAL STATES: Chronic | ICD-10-CM

## 2018-10-03 DIAGNOSIS — M48.061 SPINAL STENOSIS, LUMBAR REGION WITHOUT NEUROGENIC CLAUDICATION: ICD-10-CM

## 2018-10-03 LAB
ANION GAP SERPL CALC-SCNC: 12 MMOL/L — SIGNIFICANT CHANGE UP (ref 5–17)
ANION GAP SERPL CALC-SCNC: 15 MMOL/L — SIGNIFICANT CHANGE UP (ref 5–17)
BASOPHILS # BLD AUTO: 0 K/UL — SIGNIFICANT CHANGE UP (ref 0–0.2)
BASOPHILS NFR BLD AUTO: 0 % — SIGNIFICANT CHANGE UP (ref 0–2)
BUN SERPL-MCNC: 38 MG/DL — HIGH (ref 7–23)
BUN SERPL-MCNC: 39 MG/DL — HIGH (ref 7–23)
CALCIUM SERPL-MCNC: 8.2 MG/DL — LOW (ref 8.4–10.5)
CALCIUM SERPL-MCNC: 9.3 MG/DL — SIGNIFICANT CHANGE UP (ref 8.4–10.5)
CHLORIDE SERPL-SCNC: 102 MMOL/L — SIGNIFICANT CHANGE UP (ref 96–108)
CHLORIDE SERPL-SCNC: 104 MMOL/L — SIGNIFICANT CHANGE UP (ref 96–108)
CO2 SERPL-SCNC: 19 MMOL/L — LOW (ref 22–31)
CO2 SERPL-SCNC: 20 MMOL/L — LOW (ref 22–31)
CREAT SERPL-MCNC: 2.08 MG/DL — HIGH (ref 0.5–1.3)
CREAT SERPL-MCNC: 2.14 MG/DL — HIGH (ref 0.5–1.3)
EOSINOPHIL # BLD AUTO: 0 K/UL — SIGNIFICANT CHANGE UP (ref 0–0.5)
EOSINOPHIL NFR BLD AUTO: 0.7 % — SIGNIFICANT CHANGE UP (ref 0–6)
GLUCOSE BLDC GLUCOMTR-MCNC: 143 MG/DL — HIGH (ref 70–99)
GLUCOSE BLDC GLUCOMTR-MCNC: 309 MG/DL — HIGH (ref 70–99)
GLUCOSE BLDC GLUCOMTR-MCNC: 333 MG/DL — HIGH (ref 70–99)
GLUCOSE SERPL-MCNC: 237 MG/DL — HIGH (ref 70–99)
GLUCOSE SERPL-MCNC: 327 MG/DL — HIGH (ref 70–99)
HCT VFR BLD CALC: 28 % — LOW (ref 39–50)
HCT VFR BLD CALC: 33.4 % — LOW (ref 39–50)
HGB BLD-MCNC: 11.4 G/DL — LOW (ref 13–17)
HGB BLD-MCNC: 9.8 G/DL — LOW (ref 13–17)
LYMPHOCYTES # BLD AUTO: 0.5 K/UL — LOW (ref 1–3.3)
LYMPHOCYTES # BLD AUTO: 7.3 % — LOW (ref 13–44)
MCHC RBC-ENTMCNC: 28.6 PG — SIGNIFICANT CHANGE UP (ref 27–34)
MCHC RBC-ENTMCNC: 29.2 PG — SIGNIFICANT CHANGE UP (ref 27–34)
MCHC RBC-ENTMCNC: 34 GM/DL — SIGNIFICANT CHANGE UP (ref 32–36)
MCHC RBC-ENTMCNC: 34.8 GM/DL — SIGNIFICANT CHANGE UP (ref 32–36)
MCV RBC AUTO: 83.9 FL — SIGNIFICANT CHANGE UP (ref 80–100)
MCV RBC AUTO: 84.2 FL — SIGNIFICANT CHANGE UP (ref 80–100)
MONOCYTES # BLD AUTO: 0.1 K/UL — SIGNIFICANT CHANGE UP (ref 0–0.9)
MONOCYTES NFR BLD AUTO: 1 % — LOW (ref 2–14)
NEUTROPHILS # BLD AUTO: 6.4 K/UL — SIGNIFICANT CHANGE UP (ref 1.8–7.4)
NEUTROPHILS NFR BLD AUTO: 91 % — HIGH (ref 43–77)
PLATELET # BLD AUTO: 207 K/UL — SIGNIFICANT CHANGE UP (ref 150–400)
PLATELET # BLD AUTO: 220 K/UL — SIGNIFICANT CHANGE UP (ref 150–400)
POTASSIUM SERPL-MCNC: 4.6 MMOL/L — SIGNIFICANT CHANGE UP (ref 3.5–5.3)
POTASSIUM SERPL-MCNC: 4.9 MMOL/L — SIGNIFICANT CHANGE UP (ref 3.5–5.3)
POTASSIUM SERPL-SCNC: 4.6 MMOL/L — SIGNIFICANT CHANGE UP (ref 3.5–5.3)
POTASSIUM SERPL-SCNC: 4.9 MMOL/L — SIGNIFICANT CHANGE UP (ref 3.5–5.3)
RBC # BLD: 3.34 M/UL — LOW (ref 4.2–5.8)
RBC # BLD: 3.97 M/UL — LOW (ref 4.2–5.8)
RBC # FLD: 15 % — HIGH (ref 10.3–14.5)
RBC # FLD: 15 % — HIGH (ref 10.3–14.5)
SODIUM SERPL-SCNC: 133 MMOL/L — LOW (ref 135–145)
SODIUM SERPL-SCNC: 139 MMOL/L — SIGNIFICANT CHANGE UP (ref 135–145)
WBC # BLD: 7.1 K/UL — SIGNIFICANT CHANGE UP (ref 3.8–10.5)
WBC # BLD: 7.7 K/UL — SIGNIFICANT CHANGE UP (ref 3.8–10.5)
WBC # FLD AUTO: 7.1 K/UL — SIGNIFICANT CHANGE UP (ref 3.8–10.5)
WBC # FLD AUTO: 7.7 K/UL — SIGNIFICANT CHANGE UP (ref 3.8–10.5)

## 2018-10-03 PROCEDURE — 99232 SBSQ HOSP IP/OBS MODERATE 35: CPT

## 2018-10-03 PROCEDURE — 88300 SURGICAL PATH GROSS: CPT | Mod: 26

## 2018-10-03 RX ORDER — ASPIRIN/CALCIUM CARB/MAGNESIUM 324 MG
81 TABLET ORAL ONCE
Qty: 0 | Refills: 0 | Status: COMPLETED | OUTPATIENT
Start: 2018-10-03 | End: 2018-10-03

## 2018-10-03 RX ORDER — MUPIROCIN 20 MG/G
1 OINTMENT TOPICAL
Qty: 0 | Refills: 0 | Status: DISCONTINUED | OUTPATIENT
Start: 2018-10-03 | End: 2018-10-03

## 2018-10-03 RX ORDER — VENLAFAXINE HCL 75 MG
75 CAPSULE, EXT RELEASE 24 HR ORAL DAILY
Qty: 0 | Refills: 0 | Status: DISCONTINUED | OUTPATIENT
Start: 2018-10-03 | End: 2018-10-09

## 2018-10-03 RX ORDER — ATORVASTATIN CALCIUM 80 MG/1
80 TABLET, FILM COATED ORAL AT BEDTIME
Qty: 0 | Refills: 0 | Status: DISCONTINUED | OUTPATIENT
Start: 2018-10-03 | End: 2018-10-09

## 2018-10-03 RX ORDER — CELECOXIB 200 MG/1
100 CAPSULE ORAL
Qty: 0 | Refills: 0 | Status: DISCONTINUED | OUTPATIENT
Start: 2018-10-03 | End: 2018-10-04

## 2018-10-03 RX ORDER — SENNA PLUS 8.6 MG/1
2 TABLET ORAL AT BEDTIME
Qty: 0 | Refills: 0 | Status: DISCONTINUED | OUTPATIENT
Start: 2018-10-03 | End: 2018-10-09

## 2018-10-03 RX ORDER — ONDANSETRON 8 MG/1
4 TABLET, FILM COATED ORAL ONCE
Qty: 0 | Refills: 0 | Status: DISCONTINUED | OUTPATIENT
Start: 2018-10-03 | End: 2018-10-04

## 2018-10-03 RX ORDER — TAMSULOSIN HYDROCHLORIDE 0.4 MG/1
0.4 CAPSULE ORAL AT BEDTIME
Qty: 0 | Refills: 0 | Status: DISCONTINUED | OUTPATIENT
Start: 2018-10-03 | End: 2018-10-09

## 2018-10-03 RX ORDER — CEFAZOLIN SODIUM 1 G
2000 VIAL (EA) INJECTION EVERY 8 HOURS
Qty: 0 | Refills: 0 | Status: COMPLETED | OUTPATIENT
Start: 2018-10-03 | End: 2018-10-04

## 2018-10-03 RX ORDER — FERROUS SULFATE 325(65) MG
65 TABLET ORAL
Qty: 0 | Refills: 0 | COMMUNITY

## 2018-10-03 RX ORDER — INSULIN LISPRO 100/ML
VIAL (ML) SUBCUTANEOUS EVERY 6 HOURS
Qty: 0 | Refills: 0 | Status: DISCONTINUED | OUTPATIENT
Start: 2018-10-03 | End: 2018-10-04

## 2018-10-03 RX ORDER — OXYBUTYNIN CHLORIDE 5 MG
5 TABLET ORAL
Qty: 0 | Refills: 0 | Status: DISCONTINUED | OUTPATIENT
Start: 2018-10-03 | End: 2018-10-09

## 2018-10-03 RX ORDER — ASPIRIN/CALCIUM CARB/MAGNESIUM 324 MG
81 TABLET ORAL DAILY
Qty: 0 | Refills: 0 | Status: DISCONTINUED | OUTPATIENT
Start: 2018-10-04 | End: 2018-10-09

## 2018-10-03 RX ORDER — INSULIN LISPRO 100/ML
VIAL (ML) SUBCUTANEOUS
Qty: 0 | Refills: 0 | Status: DISCONTINUED | OUTPATIENT
Start: 2018-10-03 | End: 2018-10-03

## 2018-10-03 RX ORDER — FAMOTIDINE 10 MG/ML
20 INJECTION INTRAVENOUS DAILY
Qty: 0 | Refills: 0 | Status: DISCONTINUED | OUTPATIENT
Start: 2018-10-03 | End: 2018-10-09

## 2018-10-03 RX ORDER — HYDROMORPHONE HYDROCHLORIDE 2 MG/ML
0.5 INJECTION INTRAMUSCULAR; INTRAVENOUS; SUBCUTANEOUS
Qty: 0 | Refills: 0 | Status: DISCONTINUED | OUTPATIENT
Start: 2018-10-03 | End: 2018-10-03

## 2018-10-03 RX ORDER — DEXTROSE 50 % IN WATER 50 %
25 SYRINGE (ML) INTRAVENOUS ONCE
Qty: 0 | Refills: 0 | Status: DISCONTINUED | OUTPATIENT
Start: 2018-10-03 | End: 2018-10-09

## 2018-10-03 RX ORDER — MEMANTINE HYDROCHLORIDE 10 MG/1
10 TABLET ORAL
Qty: 0 | Refills: 0 | Status: DISCONTINUED | OUTPATIENT
Start: 2018-10-03 | End: 2018-10-09

## 2018-10-03 RX ORDER — SODIUM CHLORIDE 9 MG/ML
1000 INJECTION, SOLUTION INTRAVENOUS
Qty: 0 | Refills: 0 | Status: DISCONTINUED | OUTPATIENT
Start: 2018-10-03 | End: 2018-10-09

## 2018-10-03 RX ORDER — LEVOTHYROXINE SODIUM 125 MCG
100 TABLET ORAL DAILY
Qty: 0 | Refills: 0 | Status: DISCONTINUED | OUTPATIENT
Start: 2018-10-03 | End: 2018-10-09

## 2018-10-03 RX ORDER — TRAZODONE HCL 50 MG
25 TABLET ORAL DAILY
Qty: 0 | Refills: 0 | Status: DISCONTINUED | OUTPATIENT
Start: 2018-10-03 | End: 2018-10-09

## 2018-10-03 RX ORDER — DOCUSATE SODIUM 100 MG
100 CAPSULE ORAL THREE TIMES A DAY
Qty: 0 | Refills: 0 | Status: DISCONTINUED | OUTPATIENT
Start: 2018-10-03 | End: 2018-10-09

## 2018-10-03 RX ORDER — DEXTROSE MONOHYDRATE, SODIUM CHLORIDE, AND POTASSIUM CHLORIDE 50; .745; 4.5 G/1000ML; G/1000ML; G/1000ML
1000 INJECTION, SOLUTION INTRAVENOUS
Qty: 0 | Refills: 0 | Status: DISCONTINUED | OUTPATIENT
Start: 2018-10-03 | End: 2018-10-04

## 2018-10-03 RX ORDER — FERROUS SULFATE 325(65) MG
325 TABLET ORAL DAILY
Qty: 0 | Refills: 0 | Status: DISCONTINUED | OUTPATIENT
Start: 2018-10-03 | End: 2018-10-09

## 2018-10-03 RX ORDER — ACETAMINOPHEN 500 MG
1000 TABLET ORAL ONCE
Qty: 0 | Refills: 0 | Status: COMPLETED | OUTPATIENT
Start: 2018-10-03 | End: 2018-10-03

## 2018-10-03 RX ORDER — MUPIROCIN 20 MG/G
1 OINTMENT TOPICAL
Qty: 0 | Refills: 0 | Status: COMPLETED | OUTPATIENT
Start: 2018-10-03 | End: 2018-10-05

## 2018-10-03 RX ORDER — POLYETHYLENE GLYCOL 3350 17 G/17G
17 POWDER, FOR SOLUTION ORAL
Qty: 0 | Refills: 0 | Status: DISCONTINUED | OUTPATIENT
Start: 2018-10-03 | End: 2018-10-09

## 2018-10-03 RX ORDER — CHOLECALCIFEROL (VITAMIN D3) 125 MCG
1000 CAPSULE ORAL DAILY
Qty: 0 | Refills: 0 | Status: DISCONTINUED | OUTPATIENT
Start: 2018-10-03 | End: 2018-10-09

## 2018-10-03 RX ORDER — MORPHINE SULFATE 50 MG/1
2 CAPSULE, EXTENDED RELEASE ORAL
Qty: 0 | Refills: 0 | Status: DISCONTINUED | OUTPATIENT
Start: 2018-10-03 | End: 2018-10-05

## 2018-10-03 RX ORDER — OXYCODONE HYDROCHLORIDE 5 MG/1
5 TABLET ORAL
Qty: 0 | Refills: 0 | Status: DISCONTINUED | OUTPATIENT
Start: 2018-10-03 | End: 2018-10-06

## 2018-10-03 RX ORDER — METOPROLOL TARTRATE 50 MG
12.5 TABLET ORAL
Qty: 0 | Refills: 0 | COMMUNITY

## 2018-10-03 RX ORDER — DONEPEZIL HYDROCHLORIDE 10 MG/1
10 TABLET, FILM COATED ORAL AT BEDTIME
Qty: 0 | Refills: 0 | Status: DISCONTINUED | OUTPATIENT
Start: 2018-10-03 | End: 2018-10-09

## 2018-10-03 RX ORDER — LOSARTAN POTASSIUM 100 MG/1
25 TABLET, FILM COATED ORAL DAILY
Qty: 0 | Refills: 0 | Status: DISCONTINUED | OUTPATIENT
Start: 2018-10-03 | End: 2018-10-04

## 2018-10-03 RX ORDER — DEXAMETHASONE 0.5 MG/5ML
4 ELIXIR ORAL EVERY 6 HOURS
Qty: 0 | Refills: 0 | Status: COMPLETED | OUTPATIENT
Start: 2018-10-03 | End: 2018-10-04

## 2018-10-03 RX ORDER — METOPROLOL TARTRATE 50 MG
12.5 TABLET ORAL
Qty: 0 | Refills: 0 | Status: DISCONTINUED | OUTPATIENT
Start: 2018-10-03 | End: 2018-10-03

## 2018-10-03 RX ORDER — INSULIN LISPRO 100/ML
VIAL (ML) SUBCUTANEOUS AT BEDTIME
Qty: 0 | Refills: 0 | Status: DISCONTINUED | OUTPATIENT
Start: 2018-10-03 | End: 2018-10-03

## 2018-10-03 RX ORDER — AMLODIPINE BESYLATE 2.5 MG/1
5 TABLET ORAL DAILY
Qty: 0 | Refills: 0 | Status: DISCONTINUED | OUTPATIENT
Start: 2018-10-03 | End: 2018-10-09

## 2018-10-03 RX ORDER — SODIUM BICARBONATE 1 MEQ/ML
650 SYRINGE (ML) INTRAVENOUS
Qty: 0 | Refills: 0 | Status: DISCONTINUED | OUTPATIENT
Start: 2018-10-03 | End: 2018-10-09

## 2018-10-03 RX ORDER — INFLUENZA VIRUS VACCINE 15; 15; 15; 15 UG/.5ML; UG/.5ML; UG/.5ML; UG/.5ML
0.5 SUSPENSION INTRAMUSCULAR ONCE
Qty: 0 | Refills: 0 | Status: DISCONTINUED | OUTPATIENT
Start: 2018-10-03 | End: 2018-10-09

## 2018-10-03 RX ORDER — VENLAFAXINE HCL 75 MG
1 CAPSULE, EXT RELEASE 24 HR ORAL
Qty: 0 | Refills: 0 | COMMUNITY

## 2018-10-03 RX ORDER — DEXTROSE 50 % IN WATER 50 %
12.5 SYRINGE (ML) INTRAVENOUS ONCE
Qty: 0 | Refills: 0 | Status: DISCONTINUED | OUTPATIENT
Start: 2018-10-03 | End: 2018-10-09

## 2018-10-03 RX ORDER — FERROUS SULFATE 325(65) MG
65 TABLET ORAL DAILY
Qty: 0 | Refills: 0 | Status: DISCONTINUED | OUTPATIENT
Start: 2018-10-03 | End: 2018-10-03

## 2018-10-03 RX ORDER — DEXTROSE 50 % IN WATER 50 %
15 SYRINGE (ML) INTRAVENOUS ONCE
Qty: 0 | Refills: 0 | Status: DISCONTINUED | OUTPATIENT
Start: 2018-10-03 | End: 2018-10-09

## 2018-10-03 RX ORDER — TRAZODONE HCL 50 MG
25 TABLET ORAL
Qty: 0 | Refills: 0 | COMMUNITY

## 2018-10-03 RX ORDER — GLUCAGON INJECTION, SOLUTION 0.5 MG/.1ML
1 INJECTION, SOLUTION SUBCUTANEOUS ONCE
Qty: 0 | Refills: 0 | Status: DISCONTINUED | OUTPATIENT
Start: 2018-10-03 | End: 2018-10-09

## 2018-10-03 RX ORDER — METOPROLOL TARTRATE 50 MG
12.5 TABLET ORAL
Qty: 0 | Refills: 0 | Status: DISCONTINUED | OUTPATIENT
Start: 2018-10-03 | End: 2018-10-09

## 2018-10-03 RX ORDER — METHOCARBAMOL 500 MG/1
500 TABLET, FILM COATED ORAL EVERY 6 HOURS
Qty: 0 | Refills: 0 | Status: DISCONTINUED | OUTPATIENT
Start: 2018-10-03 | End: 2018-10-09

## 2018-10-03 RX ORDER — GABAPENTIN 400 MG/1
300 CAPSULE ORAL THREE TIMES A DAY
Qty: 0 | Refills: 0 | Status: DISCONTINUED | OUTPATIENT
Start: 2018-10-03 | End: 2018-10-09

## 2018-10-03 RX ADMIN — Medication 4 MILLIGRAM(S): at 23:42

## 2018-10-03 RX ADMIN — Medication 5 MILLIGRAM(S): at 21:29

## 2018-10-03 RX ADMIN — Medication 100 MILLIGRAM(S): at 21:45

## 2018-10-03 RX ADMIN — MEMANTINE HYDROCHLORIDE 10 MILLIGRAM(S): 10 TABLET ORAL at 22:40

## 2018-10-03 RX ADMIN — CELECOXIB 100 MILLIGRAM(S): 200 CAPSULE ORAL at 21:38

## 2018-10-03 RX ADMIN — MUPIROCIN 1 APPLICATION(S): 20 OINTMENT TOPICAL at 18:10

## 2018-10-03 RX ADMIN — SODIUM CHLORIDE 3 MILLILITER(S): 9 INJECTION INTRAMUSCULAR; INTRAVENOUS; SUBCUTANEOUS at 07:27

## 2018-10-03 RX ADMIN — CELECOXIB 100 MILLIGRAM(S): 200 CAPSULE ORAL at 21:36

## 2018-10-03 RX ADMIN — DONEPEZIL HYDROCHLORIDE 10 MILLIGRAM(S): 10 TABLET, FILM COATED ORAL at 21:38

## 2018-10-03 RX ADMIN — Medication 2: at 21:11

## 2018-10-03 RX ADMIN — Medication 650 MILLIGRAM(S): at 21:34

## 2018-10-03 RX ADMIN — TAMSULOSIN HYDROCHLORIDE 0.4 MILLIGRAM(S): 0.4 CAPSULE ORAL at 21:29

## 2018-10-03 RX ADMIN — Medication 2: at 16:10

## 2018-10-03 RX ADMIN — DEXTROSE MONOHYDRATE, SODIUM CHLORIDE, AND POTASSIUM CHLORIDE 100 MILLILITER(S): 50; .745; 4.5 INJECTION, SOLUTION INTRAVENOUS at 20:59

## 2018-10-03 RX ADMIN — GABAPENTIN 300 MILLIGRAM(S): 400 CAPSULE ORAL at 21:30

## 2018-10-03 RX ADMIN — SENNA PLUS 2 TABLET(S): 8.6 TABLET ORAL at 21:33

## 2018-10-03 RX ADMIN — Medication 1000 MILLIGRAM(S): at 21:53

## 2018-10-03 RX ADMIN — ATORVASTATIN CALCIUM 80 MILLIGRAM(S): 80 TABLET, FILM COATED ORAL at 22:53

## 2018-10-03 RX ADMIN — Medication 81 MILLIGRAM(S): at 07:26

## 2018-10-03 RX ADMIN — Medication 400 MILLIGRAM(S): at 21:46

## 2018-10-03 RX ADMIN — Medication 12.5 MILLIGRAM(S): at 21:30

## 2018-10-03 RX ADMIN — Medication 8: at 23:32

## 2018-10-03 RX ADMIN — Medication 4 MILLIGRAM(S): at 17:21

## 2018-10-03 RX ADMIN — Medication 100 MILLIGRAM(S): at 21:38

## 2018-10-03 NOTE — PROGRESS NOTE ADULT - SUBJECTIVE AND OBJECTIVE BOX
HPI:  82 y/o M PMH CKD, BECK, non-compliant with CPAP, CAD, S/P coronary artery stents 2014, abnormal stress test, S/P diagnostic angiogram 8/29/18 without intervention, continuing on current medication regimen, A fib on xarelto, lumbar stenosis, c/o low keiry pain for the past 10 years getting progressively worse.  Presents today for L4-L5 posterior laminectomy, L3-S1 stabilization and fusion. (20 Sep 2018 17:03)    On admission, the patient was:  GCS:  Munoz-Gardner:  modified Otero:  ICH score:  NIHSS:    *** HIGH RISK OF DVT PRESENT ON ADMISSION ***    VITALS:  T(C): , Max: 37.3 (10-03-18 @ 06:11)  HR:  (67 - 99)  BP:  (115/65 - 169/81)  ABP:  (127/60 - 189/79)  RR:  (15 - 18)  SpO2:  (95% - 99%)  Wt(kg): --      10-03-18 @ 07:01  -  10-03-18 @ 17:13  --------------------------------------------------------  IN: 200 mL / OUT: 155 mL / NET: 45 mL      LABS:  Na: 139 (10-03 @ 15:19)  K: 4.6 (10-03 @ 15:19)  Cl: 104 (10-03 @ 15:19)  CO2: 20 (10-03 @ 15:19)  BUN: 38 (10-03 @ 15:19)  Cr: 2.14 (10-03 @ 15:19)  Glu: 237(10-03 @ 15:19)    Hgb: 11.4 (10-03 @ 15:19)  Hct: 33.4 (10-03 @ 15:19)  WBC: 7.1 (10-03 @ 15:19)  Plt: 220 (10-03 @ 15:19)      IMAGING:   Recent imaging studies were reviewed.    MEDICATIONS:  acetaminophen  IVPB .. 1000 milliGRAM(s) IV Intermittent once  amLODIPine   Tablet 5 milliGRAM(s) Oral daily  atorvastatin 80 milliGRAM(s) Oral at bedtime  ceFAZolin   IVPB 2000 milliGRAM(s) IV Intermittent every 8 hours  celecoxib 100 milliGRAM(s) Oral two times a day  cholecalciferol 1000 Unit(s) Oral daily  dexamethasone  Injectable 4 milliGRAM(s) IV Push every 6 hours  dextrose 40% Gel 15 Gram(s) Oral once PRN  dextrose 5%. 1000 milliLiter(s) IV Continuous <Continuous>  dextrose 50% Injectable 12.5 Gram(s) IV Push once  dextrose 50% Injectable 25 Gram(s) IV Push once  dextrose 50% Injectable 25 Gram(s) IV Push once  docusate sodium 100 milliGRAM(s) Oral three times a day  donepezil 10 milliGRAM(s) Oral at bedtime  famotidine    Tablet 20 milliGRAM(s) Oral daily  ferrous    sulfate Liquid 325 milliGRAM(s) Oral daily  gabapentin 300 milliGRAM(s) Oral three times a day  glucagon  Injectable 1 milliGRAM(s) IntraMuscular once PRN  HYDROmorphone  Injectable 0.5 milliGRAM(s) IV Push every 10 minutes PRN  HYDROmorphone  Injectable 0.5 milliGRAM(s) IV Push every 10 minutes PRN  influenza   Vaccine 0.5 milliLiter(s) IntraMuscular once  insulin lispro (HumaLOG) corrective regimen sliding scale   SubCutaneous three times a day before meals  insulin lispro (HumaLOG) corrective regimen sliding scale   SubCutaneous at bedtime  levothyroxine 100 MICROGram(s) Oral daily  losartan 25 milliGRAM(s) Oral daily  memantine 10 milliGRAM(s) Oral two times a day  methocarbamol 500 milliGRAM(s) Oral every 6 hours PRN  metoprolol tartrate 12.5 milliGRAM(s) Oral two times a day  morphine  - Injectable 2 milliGRAM(s) IV Push every 3 hours PRN  multivitamin 1 Tablet(s) Oral daily  mupirocin 2% Ointment 1 Application(s) Topical two times a day  ondansetron Injectable 4 milliGRAM(s) IV Push once PRN  oxybutynin 5 milliGRAM(s) Oral two times a day  oxyCODONE    IR 5 milliGRAM(s) Oral every 3 hours PRN  polyethylene glycol 3350 17 Gram(s) Oral two times a day PRN  senna 2 Tablet(s) Oral at bedtime  sodium bicarbonate 650 milliGRAM(s) Oral two times a day  sodium chloride 0.9% with potassium chloride 20 mEq/L 1000 milliLiter(s) IV Continuous <Continuous>  tamsulosin 0.4 milliGRAM(s) Oral at bedtime  traZODone 25 milliGRAM(s) Oral daily  venlafaxine XR. 75 milliGRAM(s) Oral daily    EXAMINATION:  General:  calm  HEENT:  MMM  Neuro:  awake, alert, oriented x 3, follows commands, moves all extremities  Cards:  RRR  Respiratory:  no respiratory distress  Adomen:  soft  Extremities:  no edema  Skin:  warm/dry HPI:  82 y/o M PMH CKD, BECK, non-compliant with CPAP, CAD, S/P coronary artery stents 2014, abnormal stress test, S/P diagnostic angiogram 8/29/18 without intervention, continuing on current medication regimen, A fib on xarelto, lumbar stenosis, c/o low keiry pain for the past 10 years getting progressively worse.  Presents today for L4-L5 posterior laminectomy, L3-S1 stabilization and fusion. (20 Sep 2018 17:03)    On admission, the patient was:  GCS:  Munoz-Gardner:  modified Otero:  ICH score:  NIHSS:    VITALS:  T(C): , Max: 37.3 (10-03-18 @ 06:11)  HR:  (67 - 99)  BP:  (115/65 - 169/81)  ABP:  (127/60 - 189/79)  RR:  (15 - 18)  SpO2:  (95% - 99%)  Wt(kg): --      10-03-18 @ 07:01  -  10-03-18 @ 17:13  --------------------------------------------------------  IN: 200 mL / OUT: 155 mL / NET: 45 mL      LABS:  Na: 139 (10-03 @ 15:19)  K: 4.6 (10-03 @ 15:19)  Cl: 104 (10-03 @ 15:19)  CO2: 20 (10-03 @ 15:19)  BUN: 38 (10-03 @ 15:19)  Cr: 2.14 (10-03 @ 15:19)  Glu: 237(10-03 @ 15:19)    Hgb: 11.4 (10-03 @ 15:19)  Hct: 33.4 (10-03 @ 15:19)  WBC: 7.1 (10-03 @ 15:19)  Plt: 220 (10-03 @ 15:19)      IMAGING:   Recent imaging studies were reviewed.    MEDICATIONS:  acetaminophen  IVPB .. 1000 milliGRAM(s) IV Intermittent once  amLODIPine   Tablet 5 milliGRAM(s) Oral daily  atorvastatin 80 milliGRAM(s) Oral at bedtime  ceFAZolin   IVPB 2000 milliGRAM(s) IV Intermittent every 8 hours  celecoxib 100 milliGRAM(s) Oral two times a day  cholecalciferol 1000 Unit(s) Oral daily  dexamethasone  Injectable 4 milliGRAM(s) IV Push every 6 hours  dextrose 40% Gel 15 Gram(s) Oral once PRN  dextrose 5%. 1000 milliLiter(s) IV Continuous <Continuous>  dextrose 50% Injectable 12.5 Gram(s) IV Push once  dextrose 50% Injectable 25 Gram(s) IV Push once  dextrose 50% Injectable 25 Gram(s) IV Push once  docusate sodium 100 milliGRAM(s) Oral three times a day  donepezil 10 milliGRAM(s) Oral at bedtime  famotidine    Tablet 20 milliGRAM(s) Oral daily  ferrous    sulfate Liquid 325 milliGRAM(s) Oral daily  gabapentin 300 milliGRAM(s) Oral three times a day  glucagon  Injectable 1 milliGRAM(s) IntraMuscular once PRN  HYDROmorphone  Injectable 0.5 milliGRAM(s) IV Push every 10 minutes PRN  HYDROmorphone  Injectable 0.5 milliGRAM(s) IV Push every 10 minutes PRN  influenza   Vaccine 0.5 milliLiter(s) IntraMuscular once  insulin lispro (HumaLOG) corrective regimen sliding scale   SubCutaneous three times a day before meals  insulin lispro (HumaLOG) corrective regimen sliding scale   SubCutaneous at bedtime  levothyroxine 100 MICROGram(s) Oral daily  losartan 25 milliGRAM(s) Oral daily  memantine 10 milliGRAM(s) Oral two times a day  methocarbamol 500 milliGRAM(s) Oral every 6 hours PRN  metoprolol tartrate 12.5 milliGRAM(s) Oral two times a day  morphine  - Injectable 2 milliGRAM(s) IV Push every 3 hours PRN  multivitamin 1 Tablet(s) Oral daily  mupirocin 2% Ointment 1 Application(s) Topical two times a day  ondansetron Injectable 4 milliGRAM(s) IV Push once PRN  oxybutynin 5 milliGRAM(s) Oral two times a day  oxyCODONE    IR 5 milliGRAM(s) Oral every 3 hours PRN  polyethylene glycol 3350 17 Gram(s) Oral two times a day PRN  senna 2 Tablet(s) Oral at bedtime  sodium bicarbonate 650 milliGRAM(s) Oral two times a day  sodium chloride 0.9% with potassium chloride 20 mEq/L 1000 milliLiter(s) IV Continuous <Continuous>  tamsulosin 0.4 milliGRAM(s) Oral at bedtime  traZODone 25 milliGRAM(s) Oral daily  venlafaxine XR. 75 milliGRAM(s) Oral daily    EXAMINATION:  General:  calm  HEENT:  MMM  Neuro:  awake, alert, oriented x 3, follows commands, moves all extremities  Cards:  RRR  Respiratory:  no respiratory distress  Adomen:  soft  Extremities:  no edema  Skin:  warm/dry

## 2018-10-03 NOTE — BRIEF OPERATIVE NOTE - PROCEDURE
<<-----Click on this checkbox to enter Procedure Fusion of lumbar spine at 3 or more levels using posterior or posterolateral technique with instrumentation and bone graft  10/03/2018    Active  [email or web address]  Lumbar laminectomy  10/03/2018  L4-L5  Active  [email or web address]  Removal of hardware  10/03/2018    Active  [email or web address]

## 2018-10-03 NOTE — PROGRESS NOTE ADULT - ASSESSMENT
Summary:     NEURO:  q1h neuro checks    CARDS:  -150    PULM:  sat > 92%    RENAL:  IVL    GASTRO:  advance as tolerated  ---> Stress ulcer prophylaxis:  PPI    HEME:  monitor H/H    ---> DVT prophylaxis: SCDs, hold anticoagulation, fresh    ENDO:  euglycemia    ID:  afebrile    Code status:  Full code  Disposition:  ICU    This patient was at high risk of neurologic deterioration and/or death due to:     Time spent:  45 minutes Summary: 82 y/o M PMH CAD (hx of stents in 2014), and A fib (on xarelto, currently held) s/p removal of hardware, L 4-5 laminectomy, L3 - S1 fusion w/ Iliac crest bone graft.     NEURO:  q1h neuro checks  Drains per NSGY  On Dexamethasone 4 q6  Pain control w/ Celecoxib 100 BID, Gabapentin 300 TID and Methocarbamol prn  Also, on Dilaudid prn, morphine prn, Oxycodone prn   ?Alzheimer - Continued on memantine 10 BID and Donepezil 10 qHS    CARDS:  -150  CAD - On ASA 81, Atorvastatin 80 qHS, and metoprolol 12.5 BID  HTN - On amlodipine 5 mg daily, Losartan 25 daily, and metoprolol 12.5 BID    PULM:  sat > 92%  BECK - Consider nocturnal CPAP or nasal cannula    RENAL:   On NS w/ KCl @ 100 ml/hr  CKD - Baseline Cr 2.37  Continued on sodium bicarb 650 BID  BPH - Continued on Tamsulosin 0.4 qHS    GASTRO:  advance as tolerated  ---> Stress ulcer prophylaxis: Not indicated  On Famotidine 20 daily    HEME:    Anemia - Continued on ferrous sulfate 325 mg po daily  ---> DVT prophylaxis: SCDs, hold anticoagulation since fresh post-op    ENDO:  euglycemia, SSI  Hypothyroidism - Continued on levothyroxine 100 mg po daily    ID:  afebrile  Periop abx  MRSA nares - Mupirocin topical    MISC:  ?Mood disorder - Continued on home meds of Venlafaxine 75 mg po daily and Trazodone 25 mg po daily    Code status:  Full code  Disposition:  ICU    This patient was at high risk of neurologic deterioration and/or death due to:     Time spent:  45 minutes Summary: 82 y/o M PMH CAD (hx of stents in 2014), and A fib (on xarelto, currently held) s/p removal of hardware, L 4-5 laminectomy, L3 - S1 fusion w/ Iliac crest bone graft.     NEURO:  q1h neuro checks  Drains per NSGY  On Dexamethasone 4 q6  Pain control w/ Celecoxib 100 BID, Gabapentin 300 TID and Methocarbamol prn  Also, on Dilaudid prn, morphine prn, Oxycodone prn   ?Alzheimer - Continued on memantine 10 BID and Donepezil 10 qHS    CARDS:  -150  CAD - On ASA 81, Atorvastatin 80 qHS, and metoprolol 12.5 BID  HTN - On amlodipine 5 mg daily, Losartan 25 daily, and metoprolol 12.5 BID  Hx of A fib - Rate controlled on metoprolol 12.5 BID. Xarelto held.     PULM:  sat > 92%  BECK - Consider nocturnal CPAP or nasal cannula    RENAL:   On NS w/ KCl @ 100 ml/hr  CKD - Baseline Cr 2.37  Continued on sodium bicarb 650 BID  BPH - Continued on Tamsulosin 0.4 qHS    GASTRO:  advance as tolerated  ---> Stress ulcer prophylaxis: Not indicated  On Famotidine 20 daily    HEME:    Anemia - Continued on ferrous sulfate 325 mg po daily  ---> DVT prophylaxis: SCDs, hold anticoagulation since fresh post-op    ENDO:  euglycemia, SSI  Hypothyroidism - Continued on levothyroxine 100 mg po daily    ID:  afebrile  Periop abx  MRSA nares - Mupirocin topical    MISC:  ?Mood disorder - Continued on home meds of Venlafaxine 75 mg po daily and Trazodone 25 mg po daily    Code status:  Full code  Disposition:  ICU    This patient was at high risk of neurologic deterioration and/or death due to:     Time spent:  45 minutes Summary: 84 y/o M PMH CAD (hx of stents in 2014), and A fib (on xarelto, currently held) s/p removal of hardware, L 4-5 laminectomy, L3 - S1 fusion w/ Iliac crest bone graft.     NEURO:  q1h neuro checks  Drains per NSGY  On Dexamethasone 4 q6  Pain control w/ Celecoxib 100 BID, Gabapentin 300 TID and Methocarbamol prn  Also, on Dilaudid prn, morphine prn, Oxycodone prn   ?Alzheimer - Continued on memantine 10 BID and Donepezil 10 qHS    CARDS:  -150  CAD - On ASA 81, Atorvastatin 80 qHS, and metoprolol 12.5 BID  HTN - On amlodipine 5 mg daily, Losartan 25 daily, and metoprolol 12.5 BID  Hx of A fib - Rate controlled on metoprolol 12.5 BID. Xarelto held.     PULM:  sat > 92%  BECK - Consider nocturnal CPAP or nasal cannula    RENAL:   On NS w/ KCl @ 100 ml/hr  CKD - Baseline Cr 2.37  Continued on sodium bicarb 650 BID  BPH - Continued on Tamsulosin 0.4 qHS    GASTRO:  advance as tolerated  ---> Stress ulcer prophylaxis: Not indicated  On Famotidine 20 daily    HEME:    Anemia - Continued on ferrous sulfate 325 mg po daily  ---> DVT prophylaxis: SCDs, hold anticoagulation since fresh post-op    ENDO:  euglycemia, SSI  Hypothyroidism - Continued on levothyroxine 100 mg po daily    ID:  afebrile  Periop abx  MRSA nares - Mupirocin topical    MISC:  ?Mood disorder - Continued on home meds of Venlafaxine 75 mg po daily and Trazodone 25 mg po daily    Code status:  Full code  Disposition:  PACU overnight    noncritical care Summary: 82 y/o M PMH CAD (hx of stents in 2014), and A fib (on xarelto, currently held) s/p removal of hardware, L 4-5 laminectomy, L3 - S1 fusion w/ Iliac crest bone graft.     NEURO:  q1h neuro checks  Drains per NSGY  On Dexamethasone 4 q6  Pain control w/ Celecoxib 100 BID, Gabapentin 300 TID and Methocarbamol prn  Also, on Dilaudid prn, morphine prn, Oxycodone prn   ?Alzheimer - Continued on memantine 10 BID and Donepezil 10 qHS    CARDS:  -150  CAD - On ASA 81, Atorvastatin 80 qHS, and metoprolol 12.5 BID  HTN - On amlodipine 5 mg daily, Losartan 25 daily, and metoprolol 12.5 BID  Hx of A fib - Rate controlled on metoprolol 12.5 BID. Xarelto held.     PULM:  sat > 92%  BECK - Consider nocturnal CPAP or nasal cannula    RENAL:   On NS w/ KCl @ 100 ml/hr  CKD - Baseline Cr 2.37  Continued on sodium bicarb 650 BID  BPH - Continued on Tamsulosin 0.4 qHS    GASTRO:  advance as tolerated  ---> Stress ulcer prophylaxis: Not indicated  On Famotidine 20 daily    HEME:    Received 2 units intra-op 10/3 2/2 CAD  Anemia - Continued on ferrous sulfate 325 mg po daily  ---> DVT prophylaxis: SCDs, hold anticoagulation since fresh post-op    ENDO:  euglycemia, SSI  Hypothyroidism - Continued on levothyroxine 100 mg po daily    ID:  afebrile  Periop abx  MRSA nares - Mupirocin topical    MISC:  ?Mood disorder - Continued on home meds of Venlafaxine 75 mg po daily and Trazodone 25 mg po daily    Code status:  Full code  Disposition:  PACU overnight    noncritical care

## 2018-10-04 DIAGNOSIS — Z29.9 ENCOUNTER FOR PROPHYLACTIC MEASURES, UNSPECIFIED: ICD-10-CM

## 2018-10-04 DIAGNOSIS — F03.90 UNSPECIFIED DEMENTIA WITHOUT BEHAVIORAL DISTURBANCE: ICD-10-CM

## 2018-10-04 DIAGNOSIS — M48.062 SPINAL STENOSIS, LUMBAR REGION WITH NEUROGENIC CLAUDICATION: ICD-10-CM

## 2018-10-04 DIAGNOSIS — E11.65 TYPE 2 DIABETES MELLITUS WITH HYPERGLYCEMIA: ICD-10-CM

## 2018-10-04 DIAGNOSIS — I10 ESSENTIAL (PRIMARY) HYPERTENSION: ICD-10-CM

## 2018-10-04 DIAGNOSIS — I25.10 ATHEROSCLEROTIC HEART DISEASE OF NATIVE CORONARY ARTERY WITHOUT ANGINA PECTORIS: ICD-10-CM

## 2018-10-04 DIAGNOSIS — N18.3 CHRONIC KIDNEY DISEASE, STAGE 3 (MODERATE): ICD-10-CM

## 2018-10-04 DIAGNOSIS — E03.9 HYPOTHYROIDISM, UNSPECIFIED: ICD-10-CM

## 2018-10-04 DIAGNOSIS — I48.91 UNSPECIFIED ATRIAL FIBRILLATION: ICD-10-CM

## 2018-10-04 LAB
ANION GAP SERPL CALC-SCNC: 11 MMOL/L — SIGNIFICANT CHANGE UP (ref 5–17)
BASOPHILS # BLD AUTO: 0 K/UL — SIGNIFICANT CHANGE UP (ref 0–0.2)
BASOPHILS NFR BLD AUTO: 0 % — SIGNIFICANT CHANGE UP (ref 0–2)
BUN SERPL-MCNC: 40 MG/DL — HIGH (ref 7–23)
CALCIUM SERPL-MCNC: 8.2 MG/DL — LOW (ref 8.4–10.5)
CHLORIDE SERPL-SCNC: 106 MMOL/L — SIGNIFICANT CHANGE UP (ref 96–108)
CO2 SERPL-SCNC: 20 MMOL/L — LOW (ref 22–31)
CREAT SERPL-MCNC: 2.01 MG/DL — HIGH (ref 0.5–1.3)
EOSINOPHIL # BLD AUTO: 0 K/UL — SIGNIFICANT CHANGE UP (ref 0–0.5)
EOSINOPHIL NFR BLD AUTO: 0.1 % — SIGNIFICANT CHANGE UP (ref 0–6)
GLUCOSE BLDC GLUCOMTR-MCNC: 117 MG/DL — HIGH (ref 70–99)
GLUCOSE BLDC GLUCOMTR-MCNC: 128 MG/DL — HIGH (ref 70–99)
GLUCOSE BLDC GLUCOMTR-MCNC: 143 MG/DL — HIGH (ref 70–99)
GLUCOSE BLDC GLUCOMTR-MCNC: 163 MG/DL — HIGH (ref 70–99)
GLUCOSE BLDC GLUCOMTR-MCNC: 164 MG/DL — HIGH (ref 70–99)
GLUCOSE BLDC GLUCOMTR-MCNC: 171 MG/DL — HIGH (ref 70–99)
GLUCOSE BLDC GLUCOMTR-MCNC: 176 MG/DL — HIGH (ref 70–99)
GLUCOSE BLDC GLUCOMTR-MCNC: 182 MG/DL — HIGH (ref 70–99)
GLUCOSE BLDC GLUCOMTR-MCNC: 185 MG/DL — HIGH (ref 70–99)
GLUCOSE BLDC GLUCOMTR-MCNC: 189 MG/DL — HIGH (ref 70–99)
GLUCOSE BLDC GLUCOMTR-MCNC: 192 MG/DL — HIGH (ref 70–99)
GLUCOSE BLDC GLUCOMTR-MCNC: 200 MG/DL — HIGH (ref 70–99)
GLUCOSE BLDC GLUCOMTR-MCNC: 248 MG/DL — HIGH (ref 70–99)
GLUCOSE BLDC GLUCOMTR-MCNC: 269 MG/DL — HIGH (ref 70–99)
GLUCOSE BLDC GLUCOMTR-MCNC: 292 MG/DL — HIGH (ref 70–99)
GLUCOSE BLDC GLUCOMTR-MCNC: 351 MG/DL — HIGH (ref 70–99)
GLUCOSE SERPL-MCNC: 190 MG/DL — HIGH (ref 70–99)
HCT VFR BLD CALC: 28.7 % — LOW (ref 39–50)
HCT VFR BLD CALC: 29.1 % — LOW (ref 39–50)
HGB BLD-MCNC: 9.8 G/DL — LOW (ref 13–17)
HGB BLD-MCNC: 9.9 G/DL — LOW (ref 13–17)
LYMPHOCYTES # BLD AUTO: 0.7 K/UL — LOW (ref 1–3.3)
LYMPHOCYTES # BLD AUTO: 5.7 % — LOW (ref 13–44)
MCHC RBC-ENTMCNC: 27.6 PG — SIGNIFICANT CHANGE UP (ref 27–34)
MCHC RBC-ENTMCNC: 28.5 PG — SIGNIFICANT CHANGE UP (ref 27–34)
MCHC RBC-ENTMCNC: 33.4 GM/DL — SIGNIFICANT CHANGE UP (ref 32–36)
MCHC RBC-ENTMCNC: 34.5 GM/DL — SIGNIFICANT CHANGE UP (ref 32–36)
MCV RBC AUTO: 82.6 FL — SIGNIFICANT CHANGE UP (ref 80–100)
MCV RBC AUTO: 82.7 FL — SIGNIFICANT CHANGE UP (ref 80–100)
MONOCYTES # BLD AUTO: 0.3 K/UL — SIGNIFICANT CHANGE UP (ref 0–0.9)
MONOCYTES NFR BLD AUTO: 2.4 % — SIGNIFICANT CHANGE UP (ref 2–14)
NEUTROPHILS # BLD AUTO: 10.6 K/UL — HIGH (ref 1.8–7.4)
NEUTROPHILS NFR BLD AUTO: 91.7 % — HIGH (ref 43–77)
PLATELET # BLD AUTO: 204 K/UL — SIGNIFICANT CHANGE UP (ref 150–400)
PLATELET # BLD AUTO: 205 K/UL — SIGNIFICANT CHANGE UP (ref 150–400)
POTASSIUM SERPL-MCNC: 4.9 MMOL/L — SIGNIFICANT CHANGE UP (ref 3.5–5.3)
POTASSIUM SERPL-SCNC: 4.9 MMOL/L — SIGNIFICANT CHANGE UP (ref 3.5–5.3)
RBC # BLD: 3.48 M/UL — LOW (ref 4.2–5.8)
RBC # BLD: 3.53 M/UL — LOW (ref 4.2–5.8)
RBC # FLD: 14.5 % — SIGNIFICANT CHANGE UP (ref 10.3–14.5)
RBC # FLD: 14.6 % — HIGH (ref 10.3–14.5)
SODIUM SERPL-SCNC: 137 MMOL/L — SIGNIFICANT CHANGE UP (ref 135–145)
WBC # BLD: 11.5 K/UL — HIGH (ref 3.8–10.5)
WBC # BLD: 11.7 K/UL — HIGH (ref 3.8–10.5)
WBC # FLD AUTO: 11.5 K/UL — HIGH (ref 3.8–10.5)
WBC # FLD AUTO: 11.7 K/UL — HIGH (ref 3.8–10.5)

## 2018-10-04 PROCEDURE — 99223 1ST HOSP IP/OBS HIGH 75: CPT

## 2018-10-04 PROCEDURE — 99233 SBSQ HOSP IP/OBS HIGH 50: CPT

## 2018-10-04 RX ORDER — HYDRALAZINE HCL 50 MG
5 TABLET ORAL ONCE
Qty: 0 | Refills: 0 | Status: COMPLETED | OUTPATIENT
Start: 2018-10-04 | End: 2018-10-04

## 2018-10-04 RX ORDER — INSULIN GLARGINE 100 [IU]/ML
15 INJECTION, SOLUTION SUBCUTANEOUS EVERY MORNING
Qty: 0 | Refills: 0 | Status: DISCONTINUED | OUTPATIENT
Start: 2018-10-04 | End: 2018-10-08

## 2018-10-04 RX ORDER — INSULIN LISPRO 100/ML
5 VIAL (ML) SUBCUTANEOUS
Qty: 0 | Refills: 0 | Status: DISCONTINUED | OUTPATIENT
Start: 2018-10-04 | End: 2018-10-09

## 2018-10-04 RX ORDER — INSULIN HUMAN 100 [IU]/ML
1 INJECTION, SOLUTION SUBCUTANEOUS
Qty: 100 | Refills: 0 | Status: DISCONTINUED | OUTPATIENT
Start: 2018-10-04 | End: 2018-10-04

## 2018-10-04 RX ORDER — INSULIN LISPRO 100/ML
VIAL (ML) SUBCUTANEOUS
Qty: 0 | Refills: 0 | Status: DISCONTINUED | OUTPATIENT
Start: 2018-10-04 | End: 2018-10-08

## 2018-10-04 RX ORDER — CELECOXIB 200 MG/1
100 CAPSULE ORAL
Qty: 0 | Refills: 0 | Status: DISCONTINUED | OUTPATIENT
Start: 2018-10-04 | End: 2018-10-04

## 2018-10-04 RX ORDER — LOSARTAN POTASSIUM 100 MG/1
50 TABLET, FILM COATED ORAL DAILY
Qty: 0 | Refills: 0 | Status: DISCONTINUED | OUTPATIENT
Start: 2018-10-04 | End: 2018-10-09

## 2018-10-04 RX ORDER — LOSARTAN POTASSIUM 100 MG/1
25 TABLET, FILM COATED ORAL ONCE
Qty: 0 | Refills: 0 | Status: COMPLETED | OUTPATIENT
Start: 2018-10-04 | End: 2018-10-04

## 2018-10-04 RX ADMIN — SENNA PLUS 2 TABLET(S): 8.6 TABLET ORAL at 21:53

## 2018-10-04 RX ADMIN — FAMOTIDINE 20 MILLIGRAM(S): 10 INJECTION INTRAVENOUS at 11:13

## 2018-10-04 RX ADMIN — Medication 4 MILLIGRAM(S): at 05:51

## 2018-10-04 RX ADMIN — Medication 650 MILLIGRAM(S): at 05:52

## 2018-10-04 RX ADMIN — Medication 2: at 14:58

## 2018-10-04 RX ADMIN — AMLODIPINE BESYLATE 5 MILLIGRAM(S): 2.5 TABLET ORAL at 05:51

## 2018-10-04 RX ADMIN — GABAPENTIN 300 MILLIGRAM(S): 400 CAPSULE ORAL at 21:53

## 2018-10-04 RX ADMIN — Medication 5 MILLIGRAM(S): at 01:15

## 2018-10-04 RX ADMIN — Medication 75 MILLIGRAM(S): at 11:14

## 2018-10-04 RX ADMIN — Medication 5 UNIT(S): at 14:57

## 2018-10-04 RX ADMIN — CELECOXIB 100 MILLIGRAM(S): 200 CAPSULE ORAL at 05:51

## 2018-10-04 RX ADMIN — INSULIN GLARGINE 15 UNIT(S): 100 INJECTION, SOLUTION SUBCUTANEOUS at 10:52

## 2018-10-04 RX ADMIN — TAMSULOSIN HYDROCHLORIDE 0.4 MILLIGRAM(S): 0.4 CAPSULE ORAL at 21:53

## 2018-10-04 RX ADMIN — Medication 650 MILLIGRAM(S): at 17:37

## 2018-10-04 RX ADMIN — Medication 100 MICROGRAM(S): at 05:51

## 2018-10-04 RX ADMIN — Medication 4 MILLIGRAM(S): at 11:13

## 2018-10-04 RX ADMIN — ATORVASTATIN CALCIUM 80 MILLIGRAM(S): 80 TABLET, FILM COATED ORAL at 21:53

## 2018-10-04 RX ADMIN — Medication 325 MILLIGRAM(S): at 11:13

## 2018-10-04 RX ADMIN — Medication 5 MILLIGRAM(S): at 09:38

## 2018-10-04 RX ADMIN — Medication 100 MILLIGRAM(S): at 13:33

## 2018-10-04 RX ADMIN — Medication 25 MILLIGRAM(S): at 11:46

## 2018-10-04 RX ADMIN — MEMANTINE HYDROCHLORIDE 10 MILLIGRAM(S): 10 TABLET ORAL at 17:38

## 2018-10-04 RX ADMIN — Medication 6: at 21:53

## 2018-10-04 RX ADMIN — DONEPEZIL HYDROCHLORIDE 10 MILLIGRAM(S): 10 TABLET, FILM COATED ORAL at 21:53

## 2018-10-04 RX ADMIN — Medication 100 MILLIGRAM(S): at 05:51

## 2018-10-04 RX ADMIN — Medication 1 TABLET(S): at 11:13

## 2018-10-04 RX ADMIN — Medication 12.5 MILLIGRAM(S): at 17:38

## 2018-10-04 RX ADMIN — Medication 81 MILLIGRAM(S): at 11:13

## 2018-10-04 RX ADMIN — DEXTROSE MONOHYDRATE, SODIUM CHLORIDE, AND POTASSIUM CHLORIDE 100 MILLILITER(S): 50; .745; 4.5 INJECTION, SOLUTION INTRAVENOUS at 06:26

## 2018-10-04 RX ADMIN — MUPIROCIN 1 APPLICATION(S): 20 OINTMENT TOPICAL at 17:38

## 2018-10-04 RX ADMIN — GABAPENTIN 300 MILLIGRAM(S): 400 CAPSULE ORAL at 13:33

## 2018-10-04 RX ADMIN — Medication 100 MILLIGRAM(S): at 21:53

## 2018-10-04 RX ADMIN — Medication 1000 UNIT(S): at 11:14

## 2018-10-04 RX ADMIN — INSULIN HUMAN 1 UNIT(S)/HR: 100 INJECTION, SOLUTION SUBCUTANEOUS at 06:26

## 2018-10-04 RX ADMIN — Medication 5 MILLIGRAM(S): at 21:53

## 2018-10-04 RX ADMIN — MEMANTINE HYDROCHLORIDE 10 MILLIGRAM(S): 10 TABLET ORAL at 05:51

## 2018-10-04 RX ADMIN — GABAPENTIN 300 MILLIGRAM(S): 400 CAPSULE ORAL at 05:51

## 2018-10-04 RX ADMIN — CELECOXIB 100 MILLIGRAM(S): 200 CAPSULE ORAL at 06:00

## 2018-10-04 RX ADMIN — LOSARTAN POTASSIUM 25 MILLIGRAM(S): 100 TABLET, FILM COATED ORAL at 10:23

## 2018-10-04 RX ADMIN — Medication 5 UNIT(S): at 17:41

## 2018-10-04 RX ADMIN — MUPIROCIN 1 APPLICATION(S): 20 OINTMENT TOPICAL at 05:52

## 2018-10-04 RX ADMIN — LOSARTAN POTASSIUM 25 MILLIGRAM(S): 100 TABLET, FILM COATED ORAL at 05:51

## 2018-10-04 NOTE — CONSULT NOTE ADULT - ASSESSMENT
83M with h/o CAD s/p stents (2014), DMT2 ( poorly controlled), Afib on Xarelto ,CKD 3, BECK ( non-compliant with CPAP), Lumbar stenosis , Dementia who s/p   L4-L5 posterior laminectomy, L3-S1 stabilization and fusion (10/3/18). Medicine consult called for medical comanagement.

## 2018-10-04 NOTE — PROGRESS NOTE ADULT - SUBJECTIVE AND OBJECTIVE BOX
HPI:  82 y/o M PMH CKD, BECK, non-compliant with CPAP, CAD, S/P coronary artery stents 2014, abnormal stress test, S/P diagnostic angiogram 8/29/18 without intervention, continuing on current medication regimen, A fib on xarelto, lumbar stenosis, c/o low keiry pain for the past 10 years getting progressively worse.      s/p L4-L5 posterior laminectomy, L3-S1 stabilization and fusion - operated through [facility]       VITALS:  T(C): , Max: 37.3 (10-03-18 @ 06:11)  HR:  (67 - 99)  BP:  (115/65 - 169/81)  ABP:  (127/60 - 189/79)  RR:  (15 - 18)  SpO2:  (95% - 99%)  Wt(kg): --      10-03-18 @ 07:01  -  10-03-18 @ 17:13  --------------------------------------------------------  IN: 200 mL / OUT: 155 mL / NET: 45 mL      LABS:  Na: 139 (10-03 @ 15:19)  K: 4.6 (10-03 @ 15:19)  Cl: 104 (10-03 @ 15:19)  CO2: 20 (10-03 @ 15:19)  BUN: 38 (10-03 @ 15:19)  Cr: 2.14 (10-03 @ 15:19)  Glu: 237(10-03 @ 15:19)    Hgb: 11.4 (10-03 @ 15:19)  Hct: 33.4 (10-03 @ 15:19)  WBC: 7.1 (10-03 @ 15:19)  Plt: 220 (10-03 @ 15:19)      IMAGING:   Recent imaging studies were reviewed.    MEDICATIONS:  acetaminophen  IVPB .. 1000 milliGRAM(s) IV Intermittent once  amLODIPine   Tablet 5 milliGRAM(s) Oral daily  atorvastatin 80 milliGRAM(s) Oral at bedtime  ceFAZolin   IVPB 2000 milliGRAM(s) IV Intermittent every 8 hours  celecoxib 100 milliGRAM(s) Oral two times a day  cholecalciferol 1000 Unit(s) Oral daily  dexamethasone  Injectable 4 milliGRAM(s) IV Push every 6 hours  dextrose 40% Gel 15 Gram(s) Oral once PRN  dextrose 5%. 1000 milliLiter(s) IV Continuous <Continuous>  dextrose 50% Injectable 12.5 Gram(s) IV Push once  dextrose 50% Injectable 25 Gram(s) IV Push once  dextrose 50% Injectable 25 Gram(s) IV Push once  docusate sodium 100 milliGRAM(s) Oral three times a day  donepezil 10 milliGRAM(s) Oral at bedtime  famotidine    Tablet 20 milliGRAM(s) Oral daily  ferrous    sulfate Liquid 325 milliGRAM(s) Oral daily  gabapentin 300 milliGRAM(s) Oral three times a day  glucagon  Injectable 1 milliGRAM(s) IntraMuscular once PRN  HYDROmorphone  Injectable 0.5 milliGRAM(s) IV Push every 10 minutes PRN  HYDROmorphone  Injectable 0.5 milliGRAM(s) IV Push every 10 minutes PRN  influenza   Vaccine 0.5 milliLiter(s) IntraMuscular once  insulin lispro (HumaLOG) corrective regimen sliding scale   SubCutaneous three times a day before meals  insulin lispro (HumaLOG) corrective regimen sliding scale   SubCutaneous at bedtime  levothyroxine 100 MICROGram(s) Oral daily  losartan 25 milliGRAM(s) Oral daily  memantine 10 milliGRAM(s) Oral two times a day  methocarbamol 500 milliGRAM(s) Oral every 6 hours PRN  metoprolol tartrate 12.5 milliGRAM(s) Oral two times a day  morphine  - Injectable 2 milliGRAM(s) IV Push every 3 hours PRN  multivitamin 1 Tablet(s) Oral daily  mupirocin 2% Ointment 1 Application(s) Topical two times a day  ondansetron Injectable 4 milliGRAM(s) IV Push once PRN  oxybutynin 5 milliGRAM(s) Oral two times a day  oxyCODONE    IR 5 milliGRAM(s) Oral every 3 hours PRN  polyethylene glycol 3350 17 Gram(s) Oral two times a day PRN  senna 2 Tablet(s) Oral at bedtime  sodium bicarbonate 650 milliGRAM(s) Oral two times a day  sodium chloride 0.9% with potassium chloride 20 mEq/L 1000 milliLiter(s) IV Continuous <Continuous>  tamsulosin 0.4 milliGRAM(s) Oral at bedtime  traZODone 25 milliGRAM(s) Oral daily  venlafaxine XR. 75 milliGRAM(s) Oral daily    EXAMINATION:  General:  calm  HEENT:  MMM  Neuro:  awake, alert, oriented x 3, BLE UE 4/5, BLE at least antigravity   Cards:  RRR  Respiratory:  no respiratory distress  Adomen:  soft  Extremities:  no edema  Skin:  warm/dry HPI:  82 y/o M PMH CKD, BECK, non-compliant with CPAP, CAD, S/P coronary artery stents 2014, abnormal stress test, S/P diagnostic angiogram 8/29/18 without intervention, continuing on current medication regimen, A fib on xarelto, lumbar stenosis, c/o low keiry pain for the past 10 years getting progressively worse.      s/p L4-L5 posterior laminectomy, L3-S1 stabilization and fusion - operated through ASA     Started on Insulin gtt for persistent hyperglycemia     VITALS:  T(C): , Max: 37.3 (10-03-18 @ 06:11)  HR:  (67 - 99)  BP:  (115/65 - 169/81)  ABP:  (127/60 - 189/79)  RR:  (15 - 18)  SpO2:  (95% - 99%)  Wt(kg): --      10-03-18 @ 07:01  -  10-03-18 @ 17:13  --------------------------------------------------------  IN: 200 mL / OUT: 155 mL / NET: 45 mL      LABS:  Na: 139 (10-03 @ 15:19)  K: 4.6 (10-03 @ 15:19)  Cl: 104 (10-03 @ 15:19)  CO2: 20 (10-03 @ 15:19)  BUN: 38 (10-03 @ 15:19)  Cr: 2.14 (10-03 @ 15:19)  Glu: 237(10-03 @ 15:19)    Hgb: 11.4 (10-03 @ 15:19)  Hct: 33.4 (10-03 @ 15:19)  WBC: 7.1 (10-03 @ 15:19)  Plt: 220 (10-03 @ 15:19)      IMAGING:   Recent imaging studies were reviewed.    MEDICATIONS:  acetaminophen  IVPB .. 1000 milliGRAM(s) IV Intermittent once  amLODIPine   Tablet 5 milliGRAM(s) Oral daily  atorvastatin 80 milliGRAM(s) Oral at bedtime  ceFAZolin   IVPB 2000 milliGRAM(s) IV Intermittent every 8 hours  celecoxib 100 milliGRAM(s) Oral two times a day  cholecalciferol 1000 Unit(s) Oral daily  dexamethasone  Injectable 4 milliGRAM(s) IV Push every 6 hours  dextrose 40% Gel 15 Gram(s) Oral once PRN  dextrose 5%. 1000 milliLiter(s) IV Continuous <Continuous>  dextrose 50% Injectable 12.5 Gram(s) IV Push once  dextrose 50% Injectable 25 Gram(s) IV Push once  dextrose 50% Injectable 25 Gram(s) IV Push once  docusate sodium 100 milliGRAM(s) Oral three times a day  donepezil 10 milliGRAM(s) Oral at bedtime  famotidine    Tablet 20 milliGRAM(s) Oral daily  ferrous    sulfate Liquid 325 milliGRAM(s) Oral daily  gabapentin 300 milliGRAM(s) Oral three times a day  glucagon  Injectable 1 milliGRAM(s) IntraMuscular once PRN  HYDROmorphone  Injectable 0.5 milliGRAM(s) IV Push every 10 minutes PRN  HYDROmorphone  Injectable 0.5 milliGRAM(s) IV Push every 10 minutes PRN  influenza   Vaccine 0.5 milliLiter(s) IntraMuscular once  insulin lispro (HumaLOG) corrective regimen sliding scale   SubCutaneous three times a day before meals  insulin lispro (HumaLOG) corrective regimen sliding scale   SubCutaneous at bedtime  levothyroxine 100 MICROGram(s) Oral daily  losartan 25 milliGRAM(s) Oral daily  memantine 10 milliGRAM(s) Oral two times a day  methocarbamol 500 milliGRAM(s) Oral every 6 hours PRN  metoprolol tartrate 12.5 milliGRAM(s) Oral two times a day  morphine  - Injectable 2 milliGRAM(s) IV Push every 3 hours PRN  multivitamin 1 Tablet(s) Oral daily  mupirocin 2% Ointment 1 Application(s) Topical two times a day  ondansetron Injectable 4 milliGRAM(s) IV Push once PRN  oxybutynin 5 milliGRAM(s) Oral two times a day  oxyCODONE    IR 5 milliGRAM(s) Oral every 3 hours PRN  polyethylene glycol 3350 17 Gram(s) Oral two times a day PRN  senna 2 Tablet(s) Oral at bedtime  sodium bicarbonate 650 milliGRAM(s) Oral two times a day  sodium chloride 0.9% with potassium chloride 20 mEq/L 1000 milliLiter(s) IV Continuous <Continuous>  tamsulosin 0.4 milliGRAM(s) Oral at bedtime  traZODone 25 milliGRAM(s) Oral daily  venlafaxine XR. 75 milliGRAM(s) Oral daily    EXAMINATION:  General:  calm  HEENT:  MMM  Neuro:  awake, alert, oriented x 3, BLE UE 4/5, BLE at least antigravity   Cards:  RRR  Respiratory:  no respiratory distress  Adomen:  soft  Extremities:  no edema  Skin:  warm/dry

## 2018-10-04 NOTE — PROGRESS NOTE ADULT - SUBJECTIVE AND OBJECTIVE BOX
HPI:  82 y/o M PMH CKD, BECK, non-compliant with CPAP, CAD, S/P coronary artery stents 2014, abnormal stress test, S/P diagnostic angiogram 8/29/18 without intervention, continuing on current medication regimen, A fib on xarelto, lumbar stenosis, c/o low keiry pain for the past 10 years getting progressively worse.  Presents today for L4-L5 posterior laminectomy, L3-S1 stabilization and fusion. (20 Sep 2018 17:03)    On admission, the patient was:  GCS:  Munoz-Gardner:  modified Otero:  ICH score:  NIHSS:    Overnight Events:     ROS: negative [] unable to obtain as patient is comatose/intubated/aphasic []     VITALS:   T(C): 36.7 (10-04-18 @ 03:00), Max: 37.4 (10-04-18 @ 01:15)  HR: 76 (10-04-18 @ 07:00) (52 - 99)  BP: 156/70 (10-04-18 @ 00:00) (111/55 - 166/78)  RR: 31 (10-04-18 @ 07:00) (15 - 31)  SpO2: 97% (10-04-18 @ 07:00) (92% - 100%)    10-03-18 @ 07:01  -  10-04-18 @ 07:00  --------------------------------------------------------  IN: 2392.5 mL / OUT: 2005 mL / NET: 387.5 mL      LABS:                          9.8    7.7   )-----------( 207      ( 03 Oct 2018 22:15 )             28.0     10-03    133<L>  |  102  |  39<H>  ----------------------------<  327<H>  4.9   |  19<L>  |  2.08<H>    Ca    8.2<L>      03 Oct 2018 22:15        MEDS:  MEDICATIONS  (STANDING):  amLODIPine   Tablet 5 milliGRAM(s) Oral daily  aspirin enteric coated 81 milliGRAM(s) Oral daily  atorvastatin 80 milliGRAM(s) Oral at bedtime  celecoxib 100 milliGRAM(s) Oral two times a day  cholecalciferol 1000 Unit(s) Oral daily  dexamethasone  Injectable 4 milliGRAM(s) IV Push every 6 hours  dextrose 5%. 1000 milliLiter(s) (50 mL/Hr) IV Continuous <Continuous>  dextrose 50% Injectable 12.5 Gram(s) IV Push once  dextrose 50% Injectable 25 Gram(s) IV Push once  dextrose 50% Injectable 25 Gram(s) IV Push once  docusate sodium 100 milliGRAM(s) Oral three times a day  donepezil 10 milliGRAM(s) Oral at bedtime  famotidine    Tablet 20 milliGRAM(s) Oral daily  ferrous    sulfate Liquid 325 milliGRAM(s) Oral daily  gabapentin 300 milliGRAM(s) Oral three times a day  influenza   Vaccine 0.5 milliLiter(s) IntraMuscular once  insulin Infusion 1 Unit(s)/Hr (1 mL/Hr) IV Continuous <Continuous>  levothyroxine 100 MICROGram(s) Oral daily  losartan 25 milliGRAM(s) Oral daily  memantine 10 milliGRAM(s) Oral two times a day  metoprolol tartrate 12.5 milliGRAM(s) Oral two times a day  multivitamin 1 Tablet(s) Oral daily  mupirocin 2% Ointment 1 Application(s) Topical two times a day  oxybutynin 5 milliGRAM(s) Oral two times a day  senna 2 Tablet(s) Oral at bedtime  sodium bicarbonate 650 milliGRAM(s) Oral two times a day  sodium chloride 0.9% with potassium chloride 20 mEq/L 1000 milliLiter(s) (100 mL/Hr) IV Continuous <Continuous>  tamsulosin 0.4 milliGRAM(s) Oral at bedtime  traZODone 25 milliGRAM(s) Oral daily  venlafaxine XR. 75 milliGRAM(s) Oral daily      EXAMINATION:  General:  calm  HEENT:  MMM  Neuro:  awake, alert, oriented x 3, follows commands, moves all extremities  Cards:  RRR  Respiratory:  no respiratory distress  Adomen:  soft  Extremities:  no edema  Skin:  warm/dry HPI:  84 y/o M PMH CKD, BECK, non-compliant with CPAP, CAD, S/P coronary artery stents 2014, abnormal stress test, S/P diagnostic angiogram 8/29/18 without intervention, continuing on current medication regimen, A fib on xarelto, lumbar stenosis, c/o low keiry pain for the past 10 years getting progressively worse.  Presents today for L4-L5 posterior laminectomy, L3-S1 stabilization and fusion. (20 Sep 2018 17:03)    On admission, the patient was:  GCS:  Munoz-Gardner:  modified Otero:  ICH score:  NIHSS:    Overnight Events: Was also started on insulin gtt secondary to elevated BG. Was transfused 1 unit prbc per surgeon.    ROS: Negative unless specified.     VITALS:   T(C): 36.7 (10-04-18 @ 03:00), Max: 37.4 (10-04-18 @ 01:15)  HR: 76 (10-04-18 @ 07:00) (52 - 99)  BP: 156/70 (10-04-18 @ 00:00) (111/55 - 166/78)  RR: 31 (10-04-18 @ 07:00) (15 - 31)  SpO2: 97% (10-04-18 @ 07:00) (92% - 100%)    10-03-18 @ 07:01  -  10-04-18 @ 07:00  --------------------------------------------------------  IN: 2392.5 mL / OUT: 2005 mL / NET: 387.5 mL      LABS:                          9.8    7.7   )-----------( 207      ( 03 Oct 2018 22:15 )             28.0     10-03    133<L>  |  102  |  39<H>  ----------------------------<  327<H>  4.9   |  19<L>  |  2.08<H>    Ca    8.2<L>      03 Oct 2018 22:15        MEDS:  MEDICATIONS  (STANDING):  amLODIPine   Tablet 5 milliGRAM(s) Oral daily  aspirin enteric coated 81 milliGRAM(s) Oral daily  atorvastatin 80 milliGRAM(s) Oral at bedtime  celecoxib 100 milliGRAM(s) Oral two times a day  cholecalciferol 1000 Unit(s) Oral daily  dexamethasone  Injectable 4 milliGRAM(s) IV Push every 6 hours  dextrose 5%. 1000 milliLiter(s) (50 mL/Hr) IV Continuous <Continuous>  dextrose 50% Injectable 12.5 Gram(s) IV Push once  dextrose 50% Injectable 25 Gram(s) IV Push once  dextrose 50% Injectable 25 Gram(s) IV Push once  docusate sodium 100 milliGRAM(s) Oral three times a day  donepezil 10 milliGRAM(s) Oral at bedtime  famotidine    Tablet 20 milliGRAM(s) Oral daily  ferrous    sulfate Liquid 325 milliGRAM(s) Oral daily  gabapentin 300 milliGRAM(s) Oral three times a day  influenza   Vaccine 0.5 milliLiter(s) IntraMuscular once  insulin Infusion 1 Unit(s)/Hr (1 mL/Hr) IV Continuous <Continuous>  levothyroxine 100 MICROGram(s) Oral daily  losartan 25 milliGRAM(s) Oral daily  memantine 10 milliGRAM(s) Oral two times a day  metoprolol tartrate 12.5 milliGRAM(s) Oral two times a day  multivitamin 1 Tablet(s) Oral daily  mupirocin 2% Ointment 1 Application(s) Topical two times a day  oxybutynin 5 milliGRAM(s) Oral two times a day  senna 2 Tablet(s) Oral at bedtime  sodium bicarbonate 650 milliGRAM(s) Oral two times a day  sodium chloride 0.9% with potassium chloride 20 mEq/L 1000 milliLiter(s) (100 mL/Hr) IV Continuous <Continuous>  tamsulosin 0.4 milliGRAM(s) Oral at bedtime  traZODone 25 milliGRAM(s) Oral daily  venlafaxine XR. 75 milliGRAM(s) Oral daily      EXAMINATION:  General:  calm  HEENT:  MMM  Neuro:  awake, alert, oriented x 3, follows commands, moves all extremities  Cards:  RRR  Respiratory:  no respiratory distress  Adomen:  soft  Extremities:  no edema  Skin:  warm/dry HPI:  82 y/o M PMH CKD, BECK, non-compliant with CPAP, CAD, S/P coronary artery stents 2014, abnormal stress test, S/P diagnostic angiogram 8/29/18 without intervention, continuing on current medication regimen, A fib on xarelto, lumbar stenosis, c/o low keiry pain for the past 10 years getting progressively worse.  Presents today for L4-L5 posterior laminectomy, L3-S1 stabilization and fusion. (20 Sep 2018 17:03)    Adm NSCU overnight    Overnight Events: Also started on insulin gtt secondary to elevated BG. Was transfused 1 unit prbc per surgeon.    ROS: Negative unless specified.     VITALS:   T(C): 36.7 (10-04-18 @ 03:00), Max: 37.4 (10-04-18 @ 01:15)  HR: 76 (10-04-18 @ 07:00) (52 - 99)  BP: 156/70 (10-04-18 @ 00:00) (111/55 - 166/78)  RR: 31 (10-04-18 @ 07:00) (15 - 31)  SpO2: 97% (10-04-18 @ 07:00) (92% - 100%)    10-03-18 @ 07:01  -  10-04-18 @ 07:00  --------------------------------------------------------  IN: 2392.5 mL / OUT: 2005 mL / NET: 387.5 mL               9.8    7.7   )-----------( 207      ( 03 Oct 2018 22:15 )             28.0     133<L>  |  102  |  39<H>  ----------------------------<  327<H>  4.9   |  19<L>  |  2.08<H>    Ca    8.2<L>      03 Oct 2018 22:15        MEDS:  MEDICATIONS  (STANDING):  amLODIPine   Tablet 5 milliGRAM(s) Oral daily  aspirin enteric coated 81 milliGRAM(s) Oral daily  atorvastatin 80 milliGRAM(s) Oral at bedtime  celecoxib 100 milliGRAM(s) Oral two times a day  cholecalciferol 1000 Unit(s) Oral daily  dexamethasone  Injectable 4 milliGRAM(s) IV Push every 6 hours  dextrose 5%. 1000 milliLiter(s) (50 mL/Hr) IV Continuous <Continuous>  dextrose 50% Injectable 12.5 Gram(s) IV Push once  dextrose 50% Injectable 25 Gram(s) IV Push once  dextrose 50% Injectable 25 Gram(s) IV Push once  docusate sodium 100 milliGRAM(s) Oral three times a day  donepezil 10 milliGRAM(s) Oral at bedtime  famotidine    Tablet 20 milliGRAM(s) Oral daily  ferrous    sulfate Liquid 325 milliGRAM(s) Oral daily  gabapentin 300 milliGRAM(s) Oral three times a day  influenza   Vaccine 0.5 milliLiter(s) IntraMuscular once  insulin Infusion 1 Unit(s)/Hr (1 mL/Hr) IV Continuous <Continuous>  levothyroxine 100 MICROGram(s) Oral daily  losartan 25 milliGRAM(s) Oral daily  memantine 10 milliGRAM(s) Oral two times a day  metoprolol tartrate 12.5 milliGRAM(s) Oral two times a day  multivitamin 1 Tablet(s) Oral daily  mupirocin 2% Ointment 1 Application(s) Topical two times a day  oxybutynin 5 milliGRAM(s) Oral two times a day  senna 2 Tablet(s) Oral at bedtime  sodium bicarbonate 650 milliGRAM(s) Oral two times a day  sodium chloride 0.9% with potassium chloride 20 mEq/L 1000 milliLiter(s) (100 mL/Hr) IV Continuous <Continuous>  tamsulosin 0.4 milliGRAM(s) Oral at bedtime  traZODone 25 milliGRAM(s) Oral daily  venlafaxine XR. 75 milliGRAM(s) Oral daily      EXAMINATION:  General:  calm  HEENT:  MMM  Neuro:  awake, alert, oriented x 3, follows commands, moves all extremities  Cards:  RRR  Respiratory:  no respiratory distress  Adomen:  soft  Extremities:  no edema  Skin:  warm/dry

## 2018-10-04 NOTE — CONSULT NOTE ADULT - PROBLEM SELECTOR RECOMMENDATION 8
- c/w donepezil and namenda  - would taper off opioid analgesia as soon as tolerated to avoid oversedation, delirium

## 2018-10-04 NOTE — CONSULT NOTE ADULT - SUBJECTIVE AND OBJECTIVE BOX
HPI:  84 y/o M PMH CKD, BECK, non-compliant with CPAP, CAD, S/P coronary artery stents 2014, abnormal stress test, S/P diagnostic angiogram 8/29/18 without intervention, continuing on current medication regimen, A fib on xarelto, lumbar stenosis, c/o low keiry pain for the past 10 years getting progressively worse.  Presents today for L4-L5 posterior laminectomy, L3-S1 stabilization and fusion. (20 Sep 2018 17:03)      PAST MEDICAL & SURGICAL HISTORY:  CKD (chronic kidney disease)  Mitral regurgitation  BECK on CPAP  Prostate cancer  History of Hypothyroidism  GERD (Gastroesophageal Reflux Disease)  Osteoarthritis  BPH (Benign Prostatic Hypertrophy)  ASHD (Arteriosclerotic Heart Disease)  Diabetes Mellitus Type II  History of Spinal Stenosis  Hypercholesterolemia  S/P tonsillectomy  Central Spinal Stenosis: x stop procedure done  S/P Cataract Surgery: b/l eyes      Review of Systems:   CONSTITUTIONAL: No fever, weight loss, or fatigue  EYES: No eye pain, visual disturbances, or discharge  ENMT:  No difficulty hearing, tinnitus, vertigo; No sinus or throat pain  NECK: No pain or stiffness  BREASTS: No pain, masses, or nipple discharge  RESPIRATORY: No cough, wheezing, chills or hemoptysis; No shortness of breath  CARDIOVASCULAR: No chest pain, palpitations, dizziness, or leg swelling  GASTROINTESTINAL: No abdominal or epigastric pain. No nausea, vomiting, or hematemesis; No diarrhea or constipation. No melena or hematochezia.  GENITOURINARY: No dysuria, frequency, hematuria, or incontinence  NEUROLOGICAL: No headaches, memory loss, loss of strength, numbness, or tremors  SKIN: No itching, burning, rashes, or lesions   LYMPH NODES: No enlarged glands  ENDOCRINE: No heat or cold intolerance; No hair loss  MUSCULOSKELETAL: No joint pain or swelling; No muscle, back, or extremity pain  PSYCHIATRIC: No depression, anxiety, mood swings, or difficulty sleeping  HEME/LYMPH: No easy bruising, or bleeding gums  ALLERY AND IMMUNOLOGIC: No hives or eczema    Allergies    No Known Allergies    Intolerances        Social History:     FAMILY HISTORY:  Family history of diabetes mellitus type II (Sibling)  Family history of heart attack (Sibling)  Family history of heart attack  Family history of heart attack      MEDICATIONS  (STANDING):  amLODIPine   Tablet 5 milliGRAM(s) Oral daily  aspirin enteric coated 81 milliGRAM(s) Oral daily  atorvastatin 80 milliGRAM(s) Oral at bedtime  cholecalciferol 1000 Unit(s) Oral daily  dextrose 5%. 1000 milliLiter(s) (50 mL/Hr) IV Continuous <Continuous>  dextrose 50% Injectable 12.5 Gram(s) IV Push once  dextrose 50% Injectable 25 Gram(s) IV Push once  dextrose 50% Injectable 25 Gram(s) IV Push once  docusate sodium 100 milliGRAM(s) Oral three times a day  donepezil 10 milliGRAM(s) Oral at bedtime  famotidine    Tablet 20 milliGRAM(s) Oral daily  ferrous    sulfate Liquid 325 milliGRAM(s) Oral daily  gabapentin 300 milliGRAM(s) Oral three times a day  influenza   Vaccine 0.5 milliLiter(s) IntraMuscular once  insulin glargine Injectable (LANTUS) 15 Unit(s) SubCutaneous every morning  insulin lispro (HumaLOG) corrective regimen sliding scale   SubCutaneous Before meals and at bedtime  insulin lispro Injectable (HumaLOG) 5 Unit(s) SubCutaneous three times a day before meals  levothyroxine 100 MICROGram(s) Oral daily  losartan 50 milliGRAM(s) Oral daily  memantine 10 milliGRAM(s) Oral two times a day  metoprolol tartrate 12.5 milliGRAM(s) Oral two times a day  multivitamin 1 Tablet(s) Oral daily  mupirocin 2% Ointment 1 Application(s) Topical two times a day  oxybutynin 5 milliGRAM(s) Oral two times a day  senna 2 Tablet(s) Oral at bedtime  sodium bicarbonate 650 milliGRAM(s) Oral two times a day  tamsulosin 0.4 milliGRAM(s) Oral at bedtime  traZODone 25 milliGRAM(s) Oral daily  venlafaxine XR. 75 milliGRAM(s) Oral daily    MEDICATIONS  (PRN):  dextrose 40% Gel 15 Gram(s) Oral once PRN Blood Glucose LESS THAN 70 milliGRAM(s)/deciliter  glucagon  Injectable 1 milliGRAM(s) IntraMuscular once PRN Glucose LESS THAN 70 milligrams/deciliter  methocarbamol 500 milliGRAM(s) Oral every 6 hours PRN Back Spasms  morphine  - Injectable 2 milliGRAM(s) IV Push every 3 hours PRN Severe Pain (7 - 10)  oxyCODONE    IR 5 milliGRAM(s) Oral every 3 hours PRN Moderate Pain (4 - 6)  polyethylene glycol 3350 17 Gram(s) Oral two times a day PRN Constipation      Vital Signs Last 24 Hrs  T(C): 36.4 (04 Oct 2018 17:28), Max: 37.4 (04 Oct 2018 01:15)  T(F): 97.5 (04 Oct 2018 17:28), Max: 99.3 (04 Oct 2018 01:15)  HR: 85 (04 Oct 2018 17:28) (52 - 88)  BP: 146/74 (04 Oct 2018 17:28) (125/55 - 156/70)  BP(mean): 92 (04 Oct 2018 14:00) (84 - 101)  RR: 18 (04 Oct 2018 17:28) (15 - 31)  SpO2: 90% (04 Oct 2018 17:28) (90% - 100%)  CAPILLARY BLOOD GLUCOSE      POCT Blood Glucose.: 351 mg/dL (04 Oct 2018 17:22)  POCT Blood Glucose.: 200 mg/dL (04 Oct 2018 14:51)  POCT Blood Glucose.: 176 mg/dL (04 Oct 2018 12:59)  POCT Blood Glucose.: 163 mg/dL (04 Oct 2018 12:04)  POCT Blood Glucose.: 189 mg/dL (04 Oct 2018 10:55)  POCT Blood Glucose.: 182 mg/dL (04 Oct 2018 10:05)  POCT Blood Glucose.: 192 mg/dL (04 Oct 2018 09:02)  POCT Blood Glucose.: 164 mg/dL (04 Oct 2018 07:52)  POCT Blood Glucose.: 171 mg/dL (04 Oct 2018 06:56)  POCT Blood Glucose.: 128 mg/dL (04 Oct 2018 06:04)  POCT Blood Glucose.: 117 mg/dL (04 Oct 2018 05:12)  POCT Blood Glucose.: 143 mg/dL (04 Oct 2018 04:21)  POCT Blood Glucose.: 185 mg/dL (04 Oct 2018 03:04)  POCT Blood Glucose.: 248 mg/dL (04 Oct 2018 02:13)  POCT Blood Glucose.: 292 mg/dL (04 Oct 2018 01:13)  POCT Blood Glucose.: 333 mg/dL (03 Oct 2018 23:28)  POCT Blood Glucose.: 309 mg/dL (03 Oct 2018 21:07)    I&O's Summary    03 Oct 2018 07:01  -  04 Oct 2018 07:00  --------------------------------------------------------  IN: 2392.5 mL / OUT: 2005 mL / NET: 387.5 mL    04 Oct 2018 07:01  -  04 Oct 2018 18:36  --------------------------------------------------------  IN: 787 mL / OUT: 975 mL / NET: -188 mL        PHYSICAL EXAM:  GENERAL: NAD, well-developed  HEAD:  Atraumatic, Normocephalic  EYES: EOMI, PERRLA, conjunctiva and sclera clear  NECK: Supple, No JVD  CHEST/LUNG: Clear to auscultation bilaterally; No wheeze  HEART: Regular rate and rhythm; No murmurs, rubs, or gallops  ABDOMEN: Soft, Nontender, Nondistended; Bowel sounds present  EXTREMITIES:  2+ Peripheral Pulses, No clubbing, cyanosis, or edema  PSYCH: AAOx3  NEUROLOGY: non-focal  SKIN: No rashes or lesions    LABS:                        9.8    11.7  )-----------( 205      ( 04 Oct 2018 12:39 )             29.1     10-04    137  |  106  |  40<H>  ----------------------------<  190<H>  4.9   |  20<L>  |  2.01<H>    Ca    8.2<L>      04 Oct 2018 09:05                RADIOLOGY & ADDITIONAL TESTS:    Imaging Personally Reviewed:    Consultant(s) Notes Reviewed:      Care Discussed with Consultants/Other Providers: HPI:  83M with h/o CAD s/p stents (2014), DMT2 ( poorly controlled), Afib on Xarelto ,CKD 3, BECK ( non-compliant with CPAP), Lumbar stenosis , Dementia who was admitted 10/3/18 for  L4-L5 posterior laminectomy, L3-S1 stabilization and fusion. Procedure was done yesterday and was well tolerated. Pt reports that post - op pain is well controlled. Medicine consult called for medical comanagement.   Per pt's wife at bedside, his FS are quite labile despite compliance with Lantus, Prandin and diet. His primary Endocrinologist is Miguel Roca.    Subjective : currently pt denies CP, SOB, polyuria, polydipsia, blurry vision. He denies pain at op site.    PAST MEDICAL & SURGICAL HISTORY:  CKD (chronic kidney disease)  Mitral regurgitation  BECK on CPAP  Prostate cancer  History of Hypothyroidism  GERD (Gastroesophageal Reflux Disease)  Osteoarthritis  BPH (Benign Prostatic Hypertrophy)  ASHD (Arteriosclerotic Heart Disease)  Diabetes Mellitus Type II  History of Spinal Stenosis  Hypercholesterolemia  S/P tonsillectomy  Central Spinal Stenosis: x stop procedure done  S/P Cataract Surgery: b/l eyes      Review of Systems:   CONSTITUTIONAL: No fever, weight loss, or fatigue  EYES: No eye pain, visual disturbances, or discharge  ENMT:  No difficulty hearing, tinnitus, vertigo; No sinus or throat pain  RESPIRATORY: No cough, wheezing, chills or hemoptysis; No shortness of breath  CARDIOVASCULAR: No chest pain, palpitations, dizziness, or leg swelling  GASTROINTESTINAL: No abdominal or epigastric pain. No nausea, vomiting, or hematemesis; No diarrhea or constipation. No melena or hematochezia.  GENITOURINARY: No dysuria, frequency, hematuria, or incontinence  NEUROLOGICAL: No headaches, memory loss, loss of strength, numbness, or tremors  SKIN: No itching, burning, rashes, or lesions   ENDOCRINE: No polyuria/polyphagia/polydipsia/ blurry vision. No heat or cold intolerance; No hair loss  MUSCULOSKELETAL: No joint pain or swelling; No muscle, back, or extremity pain  PSYCHIATRIC: No depression, anxiety, mood swings, or difficulty sleeping  HEME/LYMPH: No easy bruising, or bleeding gums    Allergies    No Known Allergies    Intolerances        Social History:     FAMILY HISTORY:  Family history of diabetes mellitus type II (Sibling)  Family history of heart attack (Sibling)  Family history of heart attack  Family history of heart attack      MEDICATIONS  (STANDING):  amLODIPine   Tablet 5 milliGRAM(s) Oral daily  aspirin enteric coated 81 milliGRAM(s) Oral daily  atorvastatin 80 milliGRAM(s) Oral at bedtime  cholecalciferol 1000 Unit(s) Oral daily  dextrose 5%. 1000 milliLiter(s) (50 mL/Hr) IV Continuous <Continuous>  dextrose 50% Injectable 12.5 Gram(s) IV Push once  dextrose 50% Injectable 25 Gram(s) IV Push once  dextrose 50% Injectable 25 Gram(s) IV Push once  docusate sodium 100 milliGRAM(s) Oral three times a day  donepezil 10 milliGRAM(s) Oral at bedtime  famotidine    Tablet 20 milliGRAM(s) Oral daily  ferrous    sulfate Liquid 325 milliGRAM(s) Oral daily  gabapentin 300 milliGRAM(s) Oral three times a day  influenza   Vaccine 0.5 milliLiter(s) IntraMuscular once  insulin glargine Injectable (LANTUS) 15 Unit(s) SubCutaneous every morning  insulin lispro (HumaLOG) corrective regimen sliding scale   SubCutaneous Before meals and at bedtime  insulin lispro Injectable (HumaLOG) 5 Unit(s) SubCutaneous three times a day before meals  levothyroxine 100 MICROGram(s) Oral daily  losartan 50 milliGRAM(s) Oral daily  memantine 10 milliGRAM(s) Oral two times a day  metoprolol tartrate 12.5 milliGRAM(s) Oral two times a day  multivitamin 1 Tablet(s) Oral daily  mupirocin 2% Ointment 1 Application(s) Topical two times a day  oxybutynin 5 milliGRAM(s) Oral two times a day  senna 2 Tablet(s) Oral at bedtime  sodium bicarbonate 650 milliGRAM(s) Oral two times a day  tamsulosin 0.4 milliGRAM(s) Oral at bedtime  traZODone 25 milliGRAM(s) Oral daily  venlafaxine XR. 75 milliGRAM(s) Oral daily    MEDICATIONS  (PRN):  dextrose 40% Gel 15 Gram(s) Oral once PRN Blood Glucose LESS THAN 70 milliGRAM(s)/deciliter  glucagon  Injectable 1 milliGRAM(s) IntraMuscular once PRN Glucose LESS THAN 70 milligrams/deciliter  methocarbamol 500 milliGRAM(s) Oral every 6 hours PRN Back Spasms  morphine  - Injectable 2 milliGRAM(s) IV Push every 3 hours PRN Severe Pain (7 - 10)  oxyCODONE    IR 5 milliGRAM(s) Oral every 3 hours PRN Moderate Pain (4 - 6)  polyethylene glycol 3350 17 Gram(s) Oral two times a day PRN Constipation      Vital Signs Last 24 Hrs  T(C): 36.4 (04 Oct 2018 17:28), Max: 37.4 (04 Oct 2018 01:15)  T(F): 97.5 (04 Oct 2018 17:28), Max: 99.3 (04 Oct 2018 01:15)  HR: 85 (04 Oct 2018 17:28) (52 - 88)  BP: 146/74 (04 Oct 2018 17:28) (125/55 - 156/70)  BP(mean): 92 (04 Oct 2018 14:00) (84 - 101)  RR: 18 (04 Oct 2018 17:28) (15 - 31)  SpO2: 90% (04 Oct 2018 17:28) (90% - 100%)  CAPILLARY BLOOD GLUCOSE      POCT Blood Glucose.: 351 mg/dL (04 Oct 2018 17:22)  POCT Blood Glucose.: 200 mg/dL (04 Oct 2018 14:51)  POCT Blood Glucose.: 176 mg/dL (04 Oct 2018 12:59)  POCT Blood Glucose.: 163 mg/dL (04 Oct 2018 12:04)  POCT Blood Glucose.: 189 mg/dL (04 Oct 2018 10:55)  POCT Blood Glucose.: 182 mg/dL (04 Oct 2018 10:05)  POCT Blood Glucose.: 192 mg/dL (04 Oct 2018 09:02)  POCT Blood Glucose.: 164 mg/dL (04 Oct 2018 07:52)  POCT Blood Glucose.: 171 mg/dL (04 Oct 2018 06:56)  POCT Blood Glucose.: 128 mg/dL (04 Oct 2018 06:04)  POCT Blood Glucose.: 117 mg/dL (04 Oct 2018 05:12)  POCT Blood Glucose.: 143 mg/dL (04 Oct 2018 04:21)  POCT Blood Glucose.: 185 mg/dL (04 Oct 2018 03:04)  POCT Blood Glucose.: 248 mg/dL (04 Oct 2018 02:13)  POCT Blood Glucose.: 292 mg/dL (04 Oct 2018 01:13)  POCT Blood Glucose.: 333 mg/dL (03 Oct 2018 23:28)  POCT Blood Glucose.: 309 mg/dL (03 Oct 2018 21:07)    I&O's Summary    03 Oct 2018 07:01  -  04 Oct 2018 07:00  --------------------------------------------------------  IN: 2392.5 mL / OUT: 2005 mL / NET: 387.5 mL    04 Oct 2018 07:01  -  04 Oct 2018 18:36  --------------------------------------------------------  IN: 787 mL / OUT: 975 mL / NET: -188 mL        PHYSICAL EXAM:  GENERAL: NAD, anicteric, afebrile  HEAD:  Atraumatic, Normocephalic  EYES: EOMI, PERRLA, conjunctiva and sclera clear  NECK: Supple, No JVD  CHEST/LUNG: Clear to auscultation bilaterally; No wheeze  HEART: Regular rate and rhythm; No murmurs, rubs, or gallops  ABDOMEN: Soft, Nontender, Nondistended; Bowel sounds present  EXTREMITIES:  2+ Peripheral Pulses, No clubbing, cyanosis, or edema  PSYCH: AAOx3  NEUROLOGY: non-focal  SKIN: No rashes or lesions    LABS:                        9.8    11.7  )-----------( 205      ( 04 Oct 2018 12:39 )             29.1     10-04    137  |  106  |  40<H>  ----------------------------<  190<H>  4.9   |  20<L>  |  2.01<H>    Ca    8.2<L>      04 Oct 2018 09:05 HPI:  83M with h/o CAD s/p stents (2014), DMT2 ( poorly controlled), Afib on Xarelto ,CKD 3, BECK ( non-compliant with CPAP), Lumbar stenosis , Dementia who was admitted 10/3/18 for  L4-L5 posterior laminectomy, L3-S1 stabilization and fusion. Procedure was done yesterday and was well tolerated. Pt reports that post - op pain is well controlled. Medicine consult called for medical comanagement.   Per pt's wife at bedside, his FS are quite labile at baseline despite compliance with DM regimen (Lantus 20U qhs, Prandin ) and diet. His primary Endocrinologist is Miguel Roca ( [phone])    Subjective : currently pt denies CP, SOB, polyuria, polydipsia, blurry vision. He denies pain at op site.    PAST MEDICAL & SURGICAL HISTORY:  CKD (chronic kidney disease)  Mitral regurgitation  BECK on CPAP  Prostate cancer  History of Hypothyroidism  GERD (Gastroesophageal Reflux Disease)  Osteoarthritis  BPH (Benign Prostatic Hypertrophy)  ASHD (Arteriosclerotic Heart Disease)  Diabetes Mellitus Type II  History of Spinal Stenosis  Hypercholesterolemia  S/P tonsillectomy  Central Spinal Stenosis: x stop procedure done  S/P Cataract Surgery: b/l eyes      Review of Systems:   CONSTITUTIONAL: No fever, weight loss, or fatigue  EYES: No eye pain, visual disturbances, or discharge  RESPIRATORY: No cough, wheezing, chills or hemoptysis; No shortness of breath  CARDIOVASCULAR: No chest pain, palpitations, dizziness, or leg swelling  GASTROINTESTINAL: No abdominal or epigastric pain. No nausea, vomiting, or hematemesis; No diarrhea or constipation. No melena or hematochezia.  GENITOURINARY: No dysuria, frequency, hematuria, or incontinence  NEUROLOGICAL: No headaches, loss of strength, numbness, or tremors  SKIN: No itching, burning, rashes, or lesions   ENDOCRINE: No polyuria/polyphagia/polydipsia/ blurry vision. No heat or cold intolerance; No hair loss  MUSCULOSKELETAL: No joint pain or swelling; mild back  pain  PSYCHIATRIC: No depression, anxiety, mood swings, or difficulty sleeping    Allergies    No Known Allergies    Intolerances        Social History:     FAMILY HISTORY:  Family history of diabetes mellitus type II (Sibling)  Family history of heart attack (Sibling)  Family history of heart attack  Family history of heart attack      MEDICATIONS  (STANDING):  amLODIPine   Tablet 5 milliGRAM(s) Oral daily  aspirin enteric coated 81 milliGRAM(s) Oral daily  atorvastatin 80 milliGRAM(s) Oral at bedtime  cholecalciferol 1000 Unit(s) Oral daily  dextrose 5%. 1000 milliLiter(s) (50 mL/Hr) IV Continuous <Continuous>  dextrose 50% Injectable 12.5 Gram(s) IV Push once  dextrose 50% Injectable 25 Gram(s) IV Push once  dextrose 50% Injectable 25 Gram(s) IV Push once  docusate sodium 100 milliGRAM(s) Oral three times a day  donepezil 10 milliGRAM(s) Oral at bedtime  famotidine    Tablet 20 milliGRAM(s) Oral daily  ferrous    sulfate Liquid 325 milliGRAM(s) Oral daily  gabapentin 300 milliGRAM(s) Oral three times a day  influenza   Vaccine 0.5 milliLiter(s) IntraMuscular once  insulin glargine Injectable (LANTUS) 15 Unit(s) SubCutaneous every morning  insulin lispro (HumaLOG) corrective regimen sliding scale   SubCutaneous Before meals and at bedtime  insulin lispro Injectable (HumaLOG) 5 Unit(s) SubCutaneous three times a day before meals  levothyroxine 100 MICROGram(s) Oral daily  losartan 50 milliGRAM(s) Oral daily  memantine 10 milliGRAM(s) Oral two times a day  metoprolol tartrate 12.5 milliGRAM(s) Oral two times a day  multivitamin 1 Tablet(s) Oral daily  mupirocin 2% Ointment 1 Application(s) Topical two times a day  oxybutynin 5 milliGRAM(s) Oral two times a day  senna 2 Tablet(s) Oral at bedtime  sodium bicarbonate 650 milliGRAM(s) Oral two times a day  tamsulosin 0.4 milliGRAM(s) Oral at bedtime  traZODone 25 milliGRAM(s) Oral daily  venlafaxine XR. 75 milliGRAM(s) Oral daily    MEDICATIONS  (PRN):  dextrose 40% Gel 15 Gram(s) Oral once PRN Blood Glucose LESS THAN 70 milliGRAM(s)/deciliter  glucagon  Injectable 1 milliGRAM(s) IntraMuscular once PRN Glucose LESS THAN 70 milligrams/deciliter  methocarbamol 500 milliGRAM(s) Oral every 6 hours PRN Back Spasms  morphine  - Injectable 2 milliGRAM(s) IV Push every 3 hours PRN Severe Pain (7 - 10)  oxyCODONE    IR 5 milliGRAM(s) Oral every 3 hours PRN Moderate Pain (4 - 6)  polyethylene glycol 3350 17 Gram(s) Oral two times a day PRN Constipation      Vital Signs Last 24 Hrs  T(C): 36.4 (04 Oct 2018 17:28), Max: 37.4 (04 Oct 2018 01:15)  T(F): 97.5 (04 Oct 2018 17:28), Max: 99.3 (04 Oct 2018 01:15)  HR: 85 (04 Oct 2018 17:28) (52 - 88)  BP: 146/74 (04 Oct 2018 17:28) (125/55 - 156/70)  BP(mean): 92 (04 Oct 2018 14:00) (84 - 101)  RR: 18 (04 Oct 2018 17:28) (15 - 31)  SpO2: 90% (04 Oct 2018 17:28) (90% - 100%)  CAPILLARY BLOOD GLUCOSE      POCT Blood Glucose.: 351 mg/dL (04 Oct 2018 17:22)  POCT Blood Glucose.: 200 mg/dL (04 Oct 2018 14:51)  POCT Blood Glucose.: 176 mg/dL (04 Oct 2018 12:59)  POCT Blood Glucose.: 163 mg/dL (04 Oct 2018 12:04)  POCT Blood Glucose.: 189 mg/dL (04 Oct 2018 10:55)  POCT Blood Glucose.: 182 mg/dL (04 Oct 2018 10:05)  POCT Blood Glucose.: 192 mg/dL (04 Oct 2018 09:02)  POCT Blood Glucose.: 164 mg/dL (04 Oct 2018 07:52)  POCT Blood Glucose.: 171 mg/dL (04 Oct 2018 06:56)  POCT Blood Glucose.: 128 mg/dL (04 Oct 2018 06:04)  POCT Blood Glucose.: 117 mg/dL (04 Oct 2018 05:12)  POCT Blood Glucose.: 143 mg/dL (04 Oct 2018 04:21)  POCT Blood Glucose.: 185 mg/dL (04 Oct 2018 03:04)  POCT Blood Glucose.: 248 mg/dL (04 Oct 2018 02:13)  POCT Blood Glucose.: 292 mg/dL (04 Oct 2018 01:13)  POCT Blood Glucose.: 333 mg/dL (03 Oct 2018 23:28)  POCT Blood Glucose.: 309 mg/dL (03 Oct 2018 21:07)    I&O's Summary    03 Oct 2018 07:01  -  04 Oct 2018 07:00  --------------------------------------------------------  IN: 2392.5 mL / OUT: 2005 mL / NET: 387.5 mL    04 Oct 2018 07:01  -  04 Oct 2018 18:36  --------------------------------------------------------  IN: 787 mL / OUT: 975 mL / NET: -188 mL        PHYSICAL EXAM:  GENERAL: NAD, anicteric, afebrile  HEAD:  Atraumatic, Normocephalic  EYES: EOMI, PERRLA, conjunctiva and sclera clear  NECK: Supple, No JVD  CHEST/LUNG: Clear to auscultation bilaterally; No wheeze  HEART: Regular rate and rhythm; No murmurs, rubs, or gallops  ABDOMEN: Soft, Nontender, Nondistended; Bowel sounds present  EXTREMITIES:  2+ Peripheral Pulses, No clubbing, cyanosis, or edema  PSYCH: Alert, oriented x 2  NEUROLOGY: non-focal  SKIN: No rashes or lesions    LABS:                        9.8    11.7  )-----------( 205      ( 04 Oct 2018 12:39 )             29.1     10-04    137  |  106  |  40<H>  ----------------------------<  190<H>  4.9   |  20<L>  |  2.01<H>    Ca    8.2<L>      04 Oct 2018 09:05

## 2018-10-04 NOTE — CONSULT NOTE ADULT - PROBLEM SELECTOR RECOMMENDATION 2
- off Xarelto on account of recent surgery  - c/w Metoprolol 12.5mg PO bid - off Xarelto on account of recent surgery  - c/w Metoprolol 12.5mg PO bid  - would obtain Cardiology follow up ( Primary Cardiologist : Ted Rodriguez)

## 2018-10-04 NOTE — CONSULT NOTE ADULT - PROBLEM SELECTOR RECOMMENDATION 7
- s/p L4-L5 posterior laminectomy, L3-S1 stabilization and fusion for spinal stenosis  - states that pain is well controlled on current regimen ; would continue  - PT  - Post op care per primary team

## 2018-10-04 NOTE — CONSULT NOTE ADULT - PROBLEM SELECTOR PROBLEM 4
Spinal stenosis of lumbar region with neurogenic claudication CKD (chronic kidney disease), stage III

## 2018-10-04 NOTE — PROGRESS NOTE ADULT - ASSESSMENT
Summary: 82 y/o M PMH CAD (hx of stents in 2014), and A fib (on xarelto, currently held) s/p removal of hardware, L 4-5 laminectomy, L3 - S1 fusion w/ Iliac crest bone graft.     NEURO:  q1h neuro checks  Drains per NSGY  On Dexamethasone 4 q6  Pain control w/ Celecoxib 100 BID, Gabapentin 300 TID and Methocarbamol prn  Also, on Dilaudid prn, morphine prn, Oxycodone prn   ?Alzheimer - Continued on memantine 10 BID and Donepezil 10 qHS    CARDS:  -150  CAD - On ASA 81, Atorvastatin 80 qHS, and metoprolol 12.5 BID  HTN - On amlodipine 5 mg daily, Losartan 25 daily, and metoprolol 12.5 BID  Hx of A fib - Rate controlled on metoprolol 12.5 BID. Xarelto held.     PULM:  sat > 92%  BECK - Consider nocturnal CPAP or nasal cannula    RENAL:   On NS w/ KCl @ 100 ml/hr  CKD - Baseline Cr 2.37  Continued on sodium bicarb 650 BID  BPH - Continued on Tamsulosin 0.4 qHS    GASTRO:  advance as tolerated  ---> Stress ulcer prophylaxis: Not indicated  On Famotidine 20 daily    HEME:    Received 2 units intra-op 10/3 2/2 CAD  Anemia - Continued on ferrous sulfate 325 mg po daily  ---> DVT prophylaxis: SCDs, hold anticoagulation since fresh post-op    ENDO:  euglycemia, SSI  Hypothyroidism - Continued on levothyroxine 100 mg po daily    ID:  afebrile  Periop abx  MRSA nares - Mupirocin topical    MISC:  ?Mood disorder - Continued on home meds of Venlafaxine 75 mg po daily and Trazodone 25 mg po daily    Code status:  Full code  Disposition:  PACU overnight    noncritical care Summary: 84 y/o M PMH CAD (hx of stents in 2014), and A fib (on xarelto, currently held) s/p removal of hardware, L 4-5 laminectomy, L3 - S1 fusion w/ Iliac crest bone graft.     NEURO:  q1h neuro checks  Drains per NSGY  On Dexamethasone 4 q6  Pain control w/ Celecoxib 100 BID, Gabapentin 300 TID and Methocarbamol prn  Also, on Dilaudid prn, morphine prn, Oxycodone prn   ?Alzheimer - Continued on memantine 10 BID and Donepezil 10 qHS    CARDS:  -150  CAD - On ASA 81, Atorvastatin 80 qHS, and metoprolol 12.5 BID  HTN - On amlodipine 5 mg daily, metoprolol 12.5 BID. Will increase Losartan to 50 mg po daily  Hx of A fib - Rate controlled on metoprolol 12.5 BID. Xarelto held.     PULM:  sat > 92%  BECK - Consider nocturnal CPAP or nasal cannula    RENAL:   On NS w/ KCl @ 100 ml/hr  CKD - Baseline Cr 2.37  Continued on sodium bicarb 650 BID  BPH - Continued on Tamsulosin 0.4 qHS    GASTRO:  advance as tolerated  ---> Stress ulcer prophylaxis: Not indicated  On Famotidine 20 daily    HEME:    Received 2 units intra-op 10/3 2/2 CAD  Received 1 unit prbC overnight 2/2 to Hgb drop of 1 unit (per surgeon); did not respond appropriately. Will monitor.   Anemia - Continued on ferrous sulfate 325 mg po daily  ---> DVT prophylaxis: SCDs, hold anticoagulation since fresh post-op    ENDO:    Elevated BG - Insulin gtt switched to Lantus 12 units + 4 units pre-meal TID + SSI  Hypothyroidism - Continued on levothyroxine 100 mg po daily    ID:  afebrile  Periop abx  MRSA nares - Mupirocin topical    MISC:  ?Mood disorder - Continued on home meds of Venlafaxine 75 mg po daily and Trazodone 25 mg po daily    Code status:  Full code  Disposition:  PACU overnight    noncritical care Summary: 84 y/o M PMH CAD (hx of stents in 2014), and A fib (on xarelto, currently held) s/p removal of hardware, L 4-5 laminectomy, L3 - S1 fusion w/ Iliac crest bone graft.     NEURO:  q1h neuro checks  Drains per NSGY  On Dexamethasone 4 q6  Pain control w/ Celecoxib 100 BID, Gabapentin 300 TID and Methocarbamol prn  Also, on Dilaudid prn, morphine prn, Oxycodone prn   ?Alzheimer - Continued on memantine 10 BID and Donepezil 10 qHS    CARDS:  -150  CAD - On ASA 81, Atorvastatin 80 qHS, and metoprolol 12.5 BID  HTN - On amlodipine 5 mg daily, metoprolol 12.5 BID. Will increase Losartan to 50 mg po daily  Hx of A fib - Rate controlled on metoprolol 12.5 BID. Xarelto held.     PULM:  sat > 92%  BECK - Consider nocturnal CPAP or nasal cannula    RENAL:   On NS w/ KCl @ 100 ml/hr  CKD - Baseline Cr 2.37  Continued on sodium bicarb 650 BID  BPH - Continued on Tamsulosin 0.4 qHS    GASTRO:  advance as tolerated  ---> Stress ulcer prophylaxis: Not indicated  On Famotidine 20 daily    HEME:    Received 2 units intra-op 10/3 2/2 CAD  Received 1 unit prbC overnight 2/2 to Hgb drop of 1 unit (per surgeon); did not respond appropriately. Will monitor.   Anemia - Continued on ferrous sulfate 325 mg po daily  ---> DVT prophylaxis: SCDs, hold anticoagulation since fresh post-op    ENDO:    Uncontrolled DM, elevated BG - Insulin gtt switched to Lantus 15 units + 5 units pre-meal TID + SSI  Hypothyroidism - Continued on levothyroxine 100 mg po daily    ID:  afebrile  Periop abx  MRSA nares - Mupirocin topical    MISC:  ?Mood disorder - Continued on home meds of Venlafaxine 75 mg po daily and Trazodone 25 mg po daily    Code status:  Full code  Disposition:  Floor    noncritically ill but medically complex

## 2018-10-04 NOTE — PROGRESS NOTE ADULT - ASSESSMENT
Summary: 84 y/o M PMH CAD (hx of stents in 2014), and A fib (on xarelto, currently held) s/p removal of hardware, L 4-5 laminectomy, L3 - S1 fusion w/ Iliac crest bone graft.     NEURO:  q1h neuro checks  Drains per NSGY  On Dexamethasone 4 q6  Pain control w/ Celecoxib 100 BID, Gabapentin 300 TID and Methocarbamol prn  Also, on Dilaudid prn, morphine prn, Oxycodone prn   Alzheimer - Continued on memantine 10 BID and Donepezil 10 qHS    CARDS:  -150  CAD - On ASA 81, Atorvastatin 80 qHS, and metoprolol 12.5 BID  HTN - On amlodipine 5 mg daily, Losartan 25 daily, and metoprolol 12.5 BID  Hx of A fib - Rate controlled on metoprolol 12.5 BID. Xarelto held.     PULM:  sat > 92%    RENAL:   On NS w/ KCl @ 100 ml/hr  CKD - Baseline Cr 2.37  BPH - Continued on Tamsulosin 0.4 qHS    GASTRO:  advance as tolerated  On Famotidine 20 daily    HEME:    Received 2 units intra-op 10/3 2/2 CAD  Anemia - Continued on ferrous sulfate 325 mg po daily, keep Hgb >10 per NSGY  ---> DVT prophylaxis: SCDs, hold anticoagulation since fresh post-op    ENDO:  euglycemia, SSI  Hypothyroidism - Continued on levothyroxine 100 mg po daily    ID:  afebrile  Periop abx  MRSA nares - Mupirocin topical    MISC:  ?Mood disorder - Continued on home meds of Venlafaxine 75 mg po daily and Trazodone 25 mg po daily    Code status:  Full code  Disposition:  PACU overnight    noncritical care Summary: 84 y/o M PMH CAD (hx of stents in 2014), and A fib (on xarelto, currently held) s/p removal of hardware, L 4-5 laminectomy, L3 - S1 fusion w/ Iliac crest bone graft.     NEURO:  q1h neuro checks  Drains per NSGY  On Dexamethasone 4 q6  Pain control w/ Celecoxib 100 BID, Gabapentin 300 TID and Methocarbamol prn  Also, on Dilaudid prn, morphine prn, Oxycodone prn   Alzheimer - Continued on memantine 10 BID and Donepezil 10 qHS    CARDS:  -150  CAD - On ASA 81, Atorvastatin 80 qHS, and metoprolol 12.5 BID  HTN - On amlodipine 5 mg daily, Losartan 25 daily, and metoprolol 12.5 BID  Hx of A fib - Rate controlled on metoprolol 12.5 BID. Xarelto held.     PULM:  sat > 92%    RENAL:   On NS w/ KCl @ 100 ml/hr  CKD - Baseline Cr 2.37  BPH - Continued on Tamsulosin 0.4 qHS    GASTRO:  advance as tolerated  On Famotidine 20 daily    HEME:    Received 2 units intra-op 10/3 2/2 CAD  Anemia - Continued on ferrous sulfate 325 mg po daily, keep Hgb >10 per NSGY  ---> DVT prophylaxis: SCDs, hold anticoagulation since fresh post-op    ENDO:  euglycemia, started on Insulin gtt for persistent hyperglycemia   Hypothyroidism - Continued on levothyroxine 100 mg po daily    ID:  afebrile  Periop abx  MRSA nares - Mupirocin topical    MISC:  ?Mood disorder - Continued on home meds of Venlafaxine 75 mg po daily and Trazodone 25 mg po daily    Code status:  Full code  Disposition:  PACU overnight    noncritical care

## 2018-10-04 NOTE — CONSULT NOTE ADULT - PROBLEM SELECTOR RECOMMENDATION 9
- FS control is quite labile ; per pt's wife this is also the case at home  - Current hyperglycemia may  have a component of hypermetabolic stress response in the post op period  - Home Lantus dose is 20 units qd ; pt is currently on 15U qd with poorly controlled FS ranging from 120 - 350  - Would increase Lantus to 22 units qd   - would increase prandial Lispro to 7 units tid before meals  - c/w ISS  - monitor FS qac qhs  - would obtain Endocrinology evaluation

## 2018-10-04 NOTE — PROGRESS NOTE ADULT - SUBJECTIVE AND OBJECTIVE BOX
HPI:  84 y/o M PMH CKD, BECK, non-compliant with CPAP, CAD, S/P coronary artery stents 2014, abnormal stress test, S/P diagnostic angiogram 8/29/18 without intervention, continuing on current medication regimen, A fib on xarelto, lumbar stenosis, c/o low keiry pain for the past 10 years getting progressively worse.  Presents today for L4-L5 posterior laminectomy, L3-S1 stabilization and fusion. (20 Sep 2018 17:03)    OVERNIGHT EVENTS:  Vital Signs Last 24 Hrs  T(C): 36.8 (04 Oct 2018 19:30), Max: 37.4 (04 Oct 2018 01:15)  T(F): 98.2 (04 Oct 2018 19:30), Max: 99.3 (04 Oct 2018 01:15)  HR: 82 (04 Oct 2018 19:30) (52 - 88)  BP: 91/52 (04 Oct 2018 19:30) (91/52 - 156/70)  BP(mean): 92 (04 Oct 2018 14:00) (84 - 101)  RR: 18 (04 Oct 2018 19:30) (15 - 31)  SpO2: 92% (04 Oct 2018 19:30) (90% - 100%)    I&O's Detail    03 Oct 2018 07:01  -  04 Oct 2018 07:00  --------------------------------------------------------  IN:    insulin Infusion: 11.5 mL    IV PiggyBack: 200 mL    Oral Fluid: 480 mL    Packed Red Blood Cells: 1 mL    sodium chloride 0.9% with potassium chloride 20 mEq/L: 1700 mL  Total IN: 2392.5 mL    OUT:    Accordian: 535 mL    Accordian: 255 mL    Indwelling Catheter - Urethral: 1215 mL  Total OUT: 2005 mL    Total NET: 387.5 mL      04 Oct 2018 07:01  -  04 Oct 2018 20:03  --------------------------------------------------------  IN:    insulin Infusion: 8 mL    Oral Fluid: 279 mL    sodium chloride 0.9% with potassium chloride 20 mEq/L: 500 mL  Total IN: 787 mL    OUT:    Intermittent Catheterization - Urethral: 1475 mL    Voided: 300 mL  Total OUT: 1775 mL    Total NET: -988 mL        I&O's Summary    03 Oct 2018 07:01  -  04 Oct 2018 07:00  --------------------------------------------------------  IN: 2392.5 mL / OUT: 2005 mL / NET: 387.5 mL    04 Oct 2018 07:01  -  04 Oct 2018 20:03  --------------------------------------------------------  IN: 787 mL / OUT: 1775 mL / NET: -988 mL        PHYSICAL EXAM:  Neurological:    Motor exam:         [x] Upper extremity                 D             B          T          WE       WF      HI                                               R         5/5        5/5        5/5       5/5     5/5       5/5                                               L          5/5        5/5        5/5       5/5     5/5       5/5         [x] Lower extremity                Ps          Ha        Quad    EHL        FHL                                               R        5/5        5/5        5/5       5/5         5/5                                               L         5/5        5/5       5/5       5/5          5/5                                                        [] warm well perfused; capillary refill <3 seconds     Sensation: [x] intact to light touch  [] decreased:     Gait NT    Cardiovascular:  Respiratory:  Gastrointestinal:  Genitourinary:  Extremities:  Incision/Wound: Clean and dry    TUBES/LINES:  [] CVC  [] A-line  [] Lumbar Drain  [] Ventriculostomy  [] Other    DIET:  [] NPO  [] Mechanical  [] Tube feeds    LABS:                        9.8    11.7  )-----------( 205      ( 04 Oct 2018 12:39 )             29.1     10-04    137  |  106  |  40<H>  ----------------------------<  190<H>  4.9   |  20<L>  |  2.01<H>    Ca    8.2<L>      04 Oct 2018 09:05              CAPILLARY BLOOD GLUCOSE      POCT Blood Glucose.: 351 mg/dL (04 Oct 2018 17:22)  POCT Blood Glucose.: 200 mg/dL (04 Oct 2018 14:51)  POCT Blood Glucose.: 176 mg/dL (04 Oct 2018 12:59)  POCT Blood Glucose.: 163 mg/dL (04 Oct 2018 12:04)  POCT Blood Glucose.: 189 mg/dL (04 Oct 2018 10:55)  POCT Blood Glucose.: 182 mg/dL (04 Oct 2018 10:05)  POCT Blood Glucose.: 192 mg/dL (04 Oct 2018 09:02)  POCT Blood Glucose.: 164 mg/dL (04 Oct 2018 07:52)  POCT Blood Glucose.: 171 mg/dL (04 Oct 2018 06:56)  POCT Blood Glucose.: 128 mg/dL (04 Oct 2018 06:04)  POCT Blood Glucose.: 117 mg/dL (04 Oct 2018 05:12)  POCT Blood Glucose.: 143 mg/dL (04 Oct 2018 04:21)  POCT Blood Glucose.: 185 mg/dL (04 Oct 2018 03:04)  POCT Blood Glucose.: 248 mg/dL (04 Oct 2018 02:13)  POCT Blood Glucose.: 292 mg/dL (04 Oct 2018 01:13)  POCT Blood Glucose.: 333 mg/dL (03 Oct 2018 23:28)  POCT Blood Glucose.: 309 mg/dL (03 Oct 2018 21:07)          Drug Levels: [] N/A    CSF Analysis: [] N/A      Allergies    No Known Allergies    Intolerances      MEDICATIONS:  Antibiotics:    Neuro:  donepezil 10 milliGRAM(s) Oral at bedtime  gabapentin 300 milliGRAM(s) Oral three times a day  memantine 10 milliGRAM(s) Oral two times a day  methocarbamol 500 milliGRAM(s) Oral every 6 hours PRN  morphine  - Injectable 2 milliGRAM(s) IV Push every 3 hours PRN  oxyCODONE    IR 5 milliGRAM(s) Oral every 3 hours PRN  traZODone 25 milliGRAM(s) Oral daily  venlafaxine XR. 75 milliGRAM(s) Oral daily    Anticoagulation:  aspirin enteric coated 81 milliGRAM(s) Oral daily    OTHER:  amLODIPine   Tablet 5 milliGRAM(s) Oral daily  atorvastatin 80 milliGRAM(s) Oral at bedtime  dextrose 40% Gel 15 Gram(s) Oral once PRN  dextrose 50% Injectable 12.5 Gram(s) IV Push once  dextrose 50% Injectable 25 Gram(s) IV Push once  dextrose 50% Injectable 25 Gram(s) IV Push once  docusate sodium 100 milliGRAM(s) Oral three times a day  famotidine    Tablet 20 milliGRAM(s) Oral daily  glucagon  Injectable 1 milliGRAM(s) IntraMuscular once PRN  influenza   Vaccine 0.5 milliLiter(s) IntraMuscular once  insulin glargine Injectable (LANTUS) 15 Unit(s) SubCutaneous every morning  insulin lispro (HumaLOG) corrective regimen sliding scale   SubCutaneous Before meals and at bedtime  insulin lispro Injectable (HumaLOG) 5 Unit(s) SubCutaneous three times a day before meals  levothyroxine 100 MICROGram(s) Oral daily  losartan 50 milliGRAM(s) Oral daily  metoprolol tartrate 12.5 milliGRAM(s) Oral two times a day  mupirocin 2% Ointment 1 Application(s) Topical two times a day  oxybutynin 5 milliGRAM(s) Oral two times a day  polyethylene glycol 3350 17 Gram(s) Oral two times a day PRN  senna 2 Tablet(s) Oral at bedtime  tamsulosin 0.4 milliGRAM(s) Oral at bedtime    IVF:  cholecalciferol 1000 Unit(s) Oral daily  dextrose 5%. 1000 milliLiter(s) IV Continuous <Continuous>  ferrous    sulfate Liquid 325 milliGRAM(s) Oral daily  multivitamin 1 Tablet(s) Oral daily  sodium bicarbonate 650 milliGRAM(s) Oral two times a day    CULTURES:    RADIOLOGY & ADDITIONAL TESTS:      ASSESSMENT:  HPI:  84 y/o M PMH CKD, BECK, non-compliant with CPAP, CAD, S/P coronary artery stents 2014, abnormal stress test, S/P diagnostic angiogram 8/29/18 without intervention, continuing on current medication regimen, A fib on xarelto, lumbar stenosis, c/o low keiry pain for the past 10 years getting progressively worse.  Presents today for L4-L5 posterior laminectomy, L3-S1 stabilization and fusion. (20 Sep 2018 17:03)    83y Male s/p    PLAN:  NEURO:  CARDIOVASCULAR:  PULMONARY:  RENAL:  GI:  HEME:  ID:  ENDO:    DVT PROPHYLAXIS:  [x] Venodynes                                [x] Heparin/Lovenox    FALL RISK:  [] Low Risk                                    [] Impulsive

## 2018-10-04 NOTE — PROGRESS NOTE ADULT - SUBJECTIVE AND OBJECTIVE BOX
Vital Signs Last 24 Hrs  T(C): 36.5 (03 Oct 2018 21:00), Max: 37.3 (03 Oct 2018 06:11)  T(F): 97.7 (03 Oct 2018 21:00), Max: 99.1 (03 Oct 2018 06:11)  HR: 55 (03 Oct 2018 23:00) (54 - 99)  BP: 147/70 (03 Oct 2018 23:00) (111/55 - 169/81)  BP(mean): 100 (03 Oct 2018 23:00) (77 - 118)  RR: 16 (03 Oct 2018 23:00) (15 - 18)  SpO2: 99% (03 Oct 2018 23:00) (92% - 100%)    EXAM  AO x3, BUE strong, BLE at least 3/5

## 2018-10-05 DIAGNOSIS — K59.00 CONSTIPATION, UNSPECIFIED: ICD-10-CM

## 2018-10-05 LAB
ANION GAP SERPL CALC-SCNC: 10 MMOL/L — SIGNIFICANT CHANGE UP (ref 5–17)
BASOPHILS # BLD AUTO: 0 K/UL — SIGNIFICANT CHANGE UP (ref 0–0.2)
BASOPHILS NFR BLD AUTO: 0 % — SIGNIFICANT CHANGE UP (ref 0–2)
BUN SERPL-MCNC: 52 MG/DL — HIGH (ref 7–23)
CALCIUM SERPL-MCNC: 8.3 MG/DL — LOW (ref 8.4–10.5)
CHLORIDE SERPL-SCNC: 108 MMOL/L — SIGNIFICANT CHANGE UP (ref 96–108)
CO2 SERPL-SCNC: 22 MMOL/L — SIGNIFICANT CHANGE UP (ref 22–31)
CREAT SERPL-MCNC: 2.33 MG/DL — HIGH (ref 0.5–1.3)
EOSINOPHIL # BLD AUTO: 0.01 K/UL — SIGNIFICANT CHANGE UP (ref 0–0.5)
EOSINOPHIL NFR BLD AUTO: 0.1 % — SIGNIFICANT CHANGE UP (ref 0–6)
GLUCOSE BLDC GLUCOMTR-MCNC: 148 MG/DL — HIGH (ref 70–99)
GLUCOSE BLDC GLUCOMTR-MCNC: 156 MG/DL — HIGH (ref 70–99)
GLUCOSE BLDC GLUCOMTR-MCNC: 197 MG/DL — HIGH (ref 70–99)
GLUCOSE BLDC GLUCOMTR-MCNC: 212 MG/DL — HIGH (ref 70–99)
GLUCOSE SERPL-MCNC: 194 MG/DL — HIGH (ref 70–99)
HCT VFR BLD CALC: 29.4 % — LOW (ref 39–50)
HGB BLD-MCNC: 9.7 G/DL — LOW (ref 13–17)
IMM GRANULOCYTES NFR BLD AUTO: 0.7 % — SIGNIFICANT CHANGE UP (ref 0–1.5)
LYMPHOCYTES # BLD AUTO: 0.83 K/UL — LOW (ref 1–3.3)
LYMPHOCYTES # BLD AUTO: 7.1 % — LOW (ref 13–44)
MCHC RBC-ENTMCNC: 27.7 PG — SIGNIFICANT CHANGE UP (ref 27–34)
MCHC RBC-ENTMCNC: 33 GM/DL — SIGNIFICANT CHANGE UP (ref 32–36)
MCV RBC AUTO: 84 FL — SIGNIFICANT CHANGE UP (ref 80–100)
MONOCYTES # BLD AUTO: 0.86 K/UL — SIGNIFICANT CHANGE UP (ref 0–0.9)
MONOCYTES NFR BLD AUTO: 7.3 % — SIGNIFICANT CHANGE UP (ref 2–14)
NEUTROPHILS # BLD AUTO: 9.93 K/UL — HIGH (ref 1.8–7.4)
NEUTROPHILS NFR BLD AUTO: 84.8 % — HIGH (ref 43–77)
PLATELET # BLD AUTO: 186 K/UL — SIGNIFICANT CHANGE UP (ref 150–400)
POTASSIUM SERPL-MCNC: 5.1 MMOL/L — SIGNIFICANT CHANGE UP (ref 3.5–5.3)
POTASSIUM SERPL-SCNC: 5.1 MMOL/L — SIGNIFICANT CHANGE UP (ref 3.5–5.3)
RBC # BLD: 3.5 M/UL — LOW (ref 4.2–5.8)
RBC # FLD: 16.1 % — HIGH (ref 10.3–14.5)
SODIUM SERPL-SCNC: 140 MMOL/L — SIGNIFICANT CHANGE UP (ref 135–145)
WBC # BLD: 11.71 K/UL — HIGH (ref 3.8–10.5)
WBC # FLD AUTO: 11.71 K/UL — HIGH (ref 3.8–10.5)

## 2018-10-05 PROCEDURE — 99233 SBSQ HOSP IP/OBS HIGH 50: CPT

## 2018-10-05 RX ORDER — HEPARIN SODIUM 5000 [USP'U]/ML
5000 INJECTION INTRAVENOUS; SUBCUTANEOUS EVERY 12 HOURS
Qty: 0 | Refills: 0 | Status: DISCONTINUED | OUTPATIENT
Start: 2018-10-05 | End: 2018-10-09

## 2018-10-05 RX ADMIN — Medication 5 MILLIGRAM(S): at 10:56

## 2018-10-05 RX ADMIN — HEPARIN SODIUM 5000 UNIT(S): 5000 INJECTION INTRAVENOUS; SUBCUTANEOUS at 18:09

## 2018-10-05 RX ADMIN — GABAPENTIN 300 MILLIGRAM(S): 400 CAPSULE ORAL at 12:50

## 2018-10-05 RX ADMIN — GABAPENTIN 300 MILLIGRAM(S): 400 CAPSULE ORAL at 21:08

## 2018-10-05 RX ADMIN — Medication 12.5 MILLIGRAM(S): at 05:22

## 2018-10-05 RX ADMIN — Medication 4: at 08:49

## 2018-10-05 RX ADMIN — Medication 25 MILLIGRAM(S): at 12:50

## 2018-10-05 RX ADMIN — MEMANTINE HYDROCHLORIDE 10 MILLIGRAM(S): 10 TABLET ORAL at 18:03

## 2018-10-05 RX ADMIN — Medication 81 MILLIGRAM(S): at 12:51

## 2018-10-05 RX ADMIN — Medication 650 MILLIGRAM(S): at 05:22

## 2018-10-05 RX ADMIN — AMLODIPINE BESYLATE 5 MILLIGRAM(S): 2.5 TABLET ORAL at 05:22

## 2018-10-05 RX ADMIN — FAMOTIDINE 20 MILLIGRAM(S): 10 INJECTION INTRAVENOUS at 12:51

## 2018-10-05 RX ADMIN — TAMSULOSIN HYDROCHLORIDE 0.4 MILLIGRAM(S): 0.4 CAPSULE ORAL at 21:08

## 2018-10-05 RX ADMIN — Medication 5 UNIT(S): at 17:57

## 2018-10-05 RX ADMIN — Medication 5 UNIT(S): at 12:51

## 2018-10-05 RX ADMIN — SENNA PLUS 2 TABLET(S): 8.6 TABLET ORAL at 21:09

## 2018-10-05 RX ADMIN — Medication 12.5 MILLIGRAM(S): at 18:03

## 2018-10-05 RX ADMIN — Medication 650 MILLIGRAM(S): at 18:03

## 2018-10-05 RX ADMIN — Medication 100 MILLIGRAM(S): at 05:22

## 2018-10-05 RX ADMIN — GABAPENTIN 300 MILLIGRAM(S): 400 CAPSULE ORAL at 05:22

## 2018-10-05 RX ADMIN — Medication 2: at 17:57

## 2018-10-05 RX ADMIN — Medication 1000 UNIT(S): at 12:51

## 2018-10-05 RX ADMIN — Medication 100 MICROGRAM(S): at 05:22

## 2018-10-05 RX ADMIN — DONEPEZIL HYDROCHLORIDE 10 MILLIGRAM(S): 10 TABLET, FILM COATED ORAL at 21:09

## 2018-10-05 RX ADMIN — Medication 5 MILLIGRAM(S): at 21:09

## 2018-10-05 RX ADMIN — MEMANTINE HYDROCHLORIDE 10 MILLIGRAM(S): 10 TABLET ORAL at 05:23

## 2018-10-05 RX ADMIN — Medication 75 MILLIGRAM(S): at 12:55

## 2018-10-05 RX ADMIN — Medication 5 UNIT(S): at 08:48

## 2018-10-05 RX ADMIN — MUPIROCIN 1 APPLICATION(S): 20 OINTMENT TOPICAL at 05:23

## 2018-10-05 RX ADMIN — Medication 1 TABLET(S): at 12:51

## 2018-10-05 RX ADMIN — INSULIN GLARGINE 15 UNIT(S): 100 INJECTION, SOLUTION SUBCUTANEOUS at 08:49

## 2018-10-05 RX ADMIN — Medication 100 MILLIGRAM(S): at 21:09

## 2018-10-05 RX ADMIN — LOSARTAN POTASSIUM 50 MILLIGRAM(S): 100 TABLET, FILM COATED ORAL at 05:22

## 2018-10-05 RX ADMIN — Medication 2: at 21:09

## 2018-10-05 RX ADMIN — MUPIROCIN 1 APPLICATION(S): 20 OINTMENT TOPICAL at 18:03

## 2018-10-05 RX ADMIN — ATORVASTATIN CALCIUM 80 MILLIGRAM(S): 80 TABLET, FILM COATED ORAL at 21:09

## 2018-10-05 RX ADMIN — Medication 100 MILLIGRAM(S): at 12:51

## 2018-10-05 NOTE — PHYSICAL THERAPY INITIAL EVALUATION ADULT - DID THE PATIENT HAVE SURGERY?
Removal of spinal hardware, L4-L5 laminectomy, L3-S1 instrumentation and fusion and Iliac crest bone graft./yes

## 2018-10-05 NOTE — PHYSICAL THERAPY INITIAL EVALUATION ADULT - ADDITIONAL COMMENTS
pt lives in a house with his wife with 5 steps to enter and 1 flight within the home. prior to admission he was amb Independently without AD within the home and use of a RW outside the home. as per wife and daughter pt with increased number of falls leading up to admission.

## 2018-10-05 NOTE — PHYSICAL THERAPY INITIAL EVALUATION ADULT - IMPAIRMENTS FOUND, PT EVAL
arousal, attention, and cognition/gait, locomotion, and balance/gross motor/neuromotor development and sensory integration/poor safety awareness/muscle strength/posture

## 2018-10-05 NOTE — PROGRESS NOTE ADULT - SUBJECTIVE AND OBJECTIVE BOX
Denilson Lyon   Pager [phone]  Office [phone]      CC: Patient is a 83y old  Male who presents with a chief complaint of admitted for L4-L5 posterior laminectomy, L3-S1 stabilization and fusion for spinal stenosis (04 Oct 2018 18:34)      SUBJECTIVE / OVERNIGHT EVENTS: Denies any pain. No N/V/D. No BM. No f/c/r. No CP/SOB    MEDICATIONS  (STANDING):  amLODIPine   Tablet 5 milliGRAM(s) Oral daily  aspirin enteric coated 81 milliGRAM(s) Oral daily  atorvastatin 80 milliGRAM(s) Oral at bedtime  cholecalciferol 1000 Unit(s) Oral daily  dextrose 5%. 1000 milliLiter(s) (50 mL/Hr) IV Continuous <Continuous>  dextrose 50% Injectable 12.5 Gram(s) IV Push once  dextrose 50% Injectable 25 Gram(s) IV Push once  dextrose 50% Injectable 25 Gram(s) IV Push once  docusate sodium 100 milliGRAM(s) Oral three times a day  donepezil 10 milliGRAM(s) Oral at bedtime  famotidine    Tablet 20 milliGRAM(s) Oral daily  ferrous    sulfate Liquid 325 milliGRAM(s) Oral daily  gabapentin 300 milliGRAM(s) Oral three times a day  influenza   Vaccine 0.5 milliLiter(s) IntraMuscular once  insulin glargine Injectable (LANTUS) 15 Unit(s) SubCutaneous every morning  insulin lispro (HumaLOG) corrective regimen sliding scale   SubCutaneous Before meals and at bedtime  insulin lispro Injectable (HumaLOG) 5 Unit(s) SubCutaneous three times a day before meals  levothyroxine 100 MICROGram(s) Oral daily  losartan 50 milliGRAM(s) Oral daily  memantine 10 milliGRAM(s) Oral two times a day  metoprolol tartrate 12.5 milliGRAM(s) Oral two times a day  multivitamin 1 Tablet(s) Oral daily  mupirocin 2% Ointment 1 Application(s) Topical two times a day  oxybutynin 5 milliGRAM(s) Oral two times a day  senna 2 Tablet(s) Oral at bedtime  sodium bicarbonate 650 milliGRAM(s) Oral two times a day  tamsulosin 0.4 milliGRAM(s) Oral at bedtime  traZODone 25 milliGRAM(s) Oral daily  venlafaxine XR. 75 milliGRAM(s) Oral daily    MEDICATIONS  (PRN):  dextrose 40% Gel 15 Gram(s) Oral once PRN Blood Glucose LESS THAN 70 milliGRAM(s)/deciliter  glucagon  Injectable 1 milliGRAM(s) IntraMuscular once PRN Glucose LESS THAN 70 milligrams/deciliter  methocarbamol 500 milliGRAM(s) Oral every 6 hours PRN Back Spasms  morphine  - Injectable 2 milliGRAM(s) IV Push every 3 hours PRN Severe Pain (7 - 10)  oxyCODONE    IR 5 milliGRAM(s) Oral every 3 hours PRN Moderate Pain (4 - 6)  polyethylene glycol 3350 17 Gram(s) Oral two times a day PRN Constipation      Vital Signs Last 24 Hrs  T(C): 36.9 (05 Oct 2018 12:26), Max: 37.1 (05 Oct 2018 04:32)  T(F): 98.4 (05 Oct 2018 12:26), Max: 98.8 (05 Oct 2018 04:32)  HR: 66 (05 Oct 2018 12:26) (57 - 88)  BP: 144/69 (05 Oct 2018 12:26) (91/52 - 161/68)  BP(mean): --  RR: 18 (05 Oct 2018 12:26) (15 - 18)  SpO2: 95% (05 Oct 2018 12:26) (90% - 98%)  CAPILLARY BLOOD GLUCOSE      POCT Blood Glucose.: 148 mg/dL (05 Oct 2018 12:20)  POCT Blood Glucose.: 212 mg/dL (05 Oct 2018 08:23)  POCT Blood Glucose.: 269 mg/dL (04 Oct 2018 21:42)  POCT Blood Glucose.: 351 mg/dL (04 Oct 2018 17:22)  POCT Blood Glucose.: 200 mg/dL (04 Oct 2018 14:51)    I&O's Summary    04 Oct 2018 07:01  -  05 Oct 2018 07:00  --------------------------------------------------------  IN: 1027 mL / OUT: 2795 mL / NET: -1768 mL    05 Oct 2018 07:01  -  05 Oct 2018 14:19  --------------------------------------------------------  IN: 120 mL / OUT: 250 mL / NET: -130 mL          PHYSICAL EXAM:    GENERAL: NAD   HEENT: EOMI, PERRL  PULM: Clear to auscultation bilaterally  CV: Regular rate and rhythm; nl S1, S2; No murmurs, rubs, or gallops  ABDOMEN: Soft, Nontender, Nondistended; Bowel sounds present  EXTREMITIES/MSK:  No edema, calf tenderness   PSYCH: AAOx3  NEUROLOGY: non-focal          LABS:                        9.7    11.71 )-----------( 186      ( 05 Oct 2018 08:15 )             29.4     10-05    140  |  108  |  52<H>  ----------------------------<  194<H>  5.1   |  22  |  2.33<H>    Ca    8.3<L>      05 Oct 2018 06:03                  RADIOLOGY & ADDITIONAL TESTS:    Imaging Personally Reviewed:    Consultant(s) Notes Reviewed:      Care Discussed with Consultants/Other Providers: neurosurg PA regarding Insulin mgmt

## 2018-10-05 NOTE — PHYSICAL THERAPY INITIAL EVALUATION ADULT - PERTINENT HX OF CURRENT PROBLEM, REHAB EVAL
82 y/o M PMH CKD, BECK, non-compliant with CPAP, CAD, S/P coronary artery stents 2014, abnormal stress test, S/P diagnostic angiogram 8/29/18, continuing on current medication regimen, A fib on xarelto, lumbar stenosis, c/o low back pain for the past 10 years getting progressively worse. s/p  Removal of spinal hardware, L4-L5 laminectomy, L3-S1 instrumentation and fusion and Iliac crest bone graft.

## 2018-10-05 NOTE — PROGRESS NOTE ADULT - PROBLEM SELECTOR PLAN 1
Improved off Decadron. fs may remain in adequate range of Decadron. Pt was on higher Lantus at home (initial consult note) but can cont current dose if fs remain less than 180. monitor

## 2018-10-05 NOTE — PROGRESS NOTE ADULT - SUBJECTIVE AND OBJECTIVE BOX
SUBJECTIVE: Pt seen     OVERNIGHT EVENTS:     Vital Signs Last 24 Hrs  T(C): 36.9 (05 Oct 2018 15:38), Max: 37.1 (05 Oct 2018 04:32)  T(F): 98.4 (05 Oct 2018 15:38), Max: 98.8 (05 Oct 2018 04:32)  HR: 65 (05 Oct 2018 15:38) (57 - 85)  BP: 133/57 (05 Oct 2018 15:38) (91/52 - 161/68)  BP(mean): --  RR: 18 (05 Oct 2018 15:38) (18 - 18)  SpO2: 95% (05 Oct 2018 15:38) (90% - 96%)    PHYSICAL EXAM:    General: No Acute Distress     Neurological: Awake, alert oriented to person, place and time, Following Commands, PERRL, EOMI, Face Symmetrical, Speech Fluent, Moving all extremities, Muscle Strength normal in all four extremities, No Drift, Sensation to Light Touch Intact    Pulmonary: Clear to Auscultation, No Rales, No Rhonchi, No Wheezes     Cardiovascular: S1, S2, Regular Rate and Rhythm     Gastrointestinal: Soft, Nontender, Nondistended     Incision: back dressing c/d/i    LABS:                        9.7    11.71 )-----------( 186      ( 05 Oct 2018 08:15 )             29.4    10-05    140  |  108  |  52<H>  ----------------------------<  194<H>  5.1   |  22  |  2.33<H>    Ca    8.3<L>      05 Oct 2018 06:03      Hemoglobin A1C, Whole Blood: 6.1 % (09-20 @ 21:00)      10-04 @ 07:01  -  10-05 @ 07:00  --------------------------------------------------------  IN: 1027 mL / OUT: 2795 mL / NET: -1768 mL    10-05 @ 07:01  -  10-05 @ 17:20  --------------------------------------------------------  IN: 280 mL / OUT: 600 mL / NET: -320 mL      DRAINS: HMV #1 150cc/24 hrs, HMV #2 120cc/224 hrs    MEDICATIONS:  Antibiotics:    Neuro:  donepezil 10 milliGRAM(s) Oral at bedtime  gabapentin 300 milliGRAM(s) Oral three times a day  memantine 10 milliGRAM(s) Oral two times a day  methocarbamol 500 milliGRAM(s) Oral every 6 hours PRN Back Spasms  morphine  - Injectable 2 milliGRAM(s) IV Push every 3 hours PRN Severe Pain (7 - 10)  oxyCODONE    IR 5 milliGRAM(s) Oral every 3 hours PRN Moderate Pain (4 - 6)  traZODone 25 milliGRAM(s) Oral daily  venlafaxine XR. 75 milliGRAM(s) Oral daily    Cardiac:  amLODIPine   Tablet 5 milliGRAM(s) Oral daily  losartan 50 milliGRAM(s) Oral daily  metoprolol tartrate 12.5 milliGRAM(s) Oral two times a day  tamsulosin 0.4 milliGRAM(s) Oral at bedtime    Pulm:    GI/:  docusate sodium 100 milliGRAM(s) Oral three times a day  famotidine    Tablet 20 milliGRAM(s) Oral daily  oxybutynin 5 milliGRAM(s) Oral two times a day  polyethylene glycol 3350 17 Gram(s) Oral two times a day PRN Constipation  senna 2 Tablet(s) Oral at bedtime    Other:   aspirin enteric coated 81 milliGRAM(s) Oral daily  atorvastatin 80 milliGRAM(s) Oral at bedtime  cholecalciferol 1000 Unit(s) Oral daily  dextrose 40% Gel 15 Gram(s) Oral once PRN Blood Glucose LESS THAN 70 milliGRAM(s)/deciliter  dextrose 5%. 1000 milliLiter(s) IV Continuous <Continuous>  dextrose 50% Injectable 12.5 Gram(s) IV Push once  dextrose 50% Injectable 25 Gram(s) IV Push once  dextrose 50% Injectable 25 Gram(s) IV Push once  ferrous    sulfate Liquid 325 milliGRAM(s) Oral daily  glucagon  Injectable 1 milliGRAM(s) IntraMuscular once PRN Glucose LESS THAN 70 milligrams/deciliter  influenza   Vaccine 0.5 milliLiter(s) IntraMuscular once  insulin glargine Injectable (LANTUS) 15 Unit(s) SubCutaneous every morning  insulin lispro (HumaLOG) corrective regimen sliding scale   SubCutaneous Before meals and at bedtime  insulin lispro Injectable (HumaLOG) 5 Unit(s) SubCutaneous three times a day before meals  levothyroxine 100 MICROGram(s) Oral daily  multivitamin 1 Tablet(s) Oral daily  mupirocin 2% Ointment 1 Application(s) Topical two times a day  sodium bicarbonate 650 milliGRAM(s) Oral two times a day    DIET: [] Regular [x] CCD [] Renal [] Puree [] Dysphagia [] Tube Feeds:     IMAGING:

## 2018-10-05 NOTE — PROGRESS NOTE ADULT - ASSESSMENT
84 y/o M PMH CKD, BECK, non-compliant with CPAP, CAD, S/P coronary artery stents 2014, abnormal stress test, S/P diagnostic angiogram 8/29/18 without intervention, continuing on current medication regimen, A fib on xarelto, lumbar stenosis, c/o low keiry pain for the past 10 years getting progressively worse.  Presents today for L4-L5 posterior laminectomy, L3-S1 stabilization and fusion. (20 Sep 2018 17:03)    PROCEDURE: 10/3 s/p removal of spinal hardware, L4-5 lami, L3-S1 instrumentation fusion, iliac crest bone graft     POD#2    PLAN:  Neuro:   - maintain HMV's  - pt is voiding  - pt had BM  - pain control- continue neurontin, methocarbamol prn spasm, oxycodone prn  - dementia- continue memantine  - depression/anxiety- continue trazodone and effexor  - hypothyroid- continue synthroid  - CKD- cr stable @ 2.3  Respiratory:   - encouraged incentive spirometry  CV:  - HTN- continue amlodipine, losartan, metoprolol  - hx of cardiac stents- continue asa 81 mg, statin  - continue flomax for BPH  Endocrine:   - DMT2- continue fingersticks, lantus 15u bedtime, humalog 5u pre meal  DVT ppx:   - venodynes, sq heparin  PT/OT:   - [facility] Perez # [id number]

## 2018-10-05 NOTE — PROGRESS NOTE ADULT - SUBJECTIVE AND OBJECTIVE BOX
HPI:  84 y/o M PMH CKD, BECK, non-compliant with CPAP, CAD, S/P coronary artery stents 2014, abnormal stress test, S/P diagnostic angiogram 8/29/18 without intervention, continuing on current medication regimen, A fib on xarelto, lumbar stenosis, c/o low keiry pain for the past 10 years getting progressively worse.  Presents today for L4-L5 posterior laminectomy, L3-S1 stabilization and fusion. (20 Sep 2018 17:03)    OVERNIGHT EVENTS:  Vital Signs Last 24 Hrs  T(C): 37 (05 Oct 2018 08:35), Max: 37.1 (04 Oct 2018 11:00)  T(F): 98.6 (05 Oct 2018 08:35), Max: 98.8 (04 Oct 2018 11:00)  HR: 57 (05 Oct 2018 08:35) (57 - 88)  BP: 161/68 (05 Oct 2018 08:35) (91/52 - 161/68)  BP(mean): 92 (04 Oct 2018 14:00) (84 - 92)  RR: 18 (05 Oct 2018 08:35) (15 - 24)  SpO2: 93% (05 Oct 2018 08:35) (90% - 99%)    I&O's Detail    04 Oct 2018 07:01  -  05 Oct 2018 07:00  --------------------------------------------------------  IN:    insulin Infusion: 8 mL    Oral Fluid: 519 mL    sodium chloride 0.9% with potassium chloride 20 mEq/L: 500 mL  Total IN: 1027 mL    OUT:    Accordian: 120 mL    Accordian: 150 mL    Intermittent Catheterization - Urethral: 2225 mL    Voided: 300 mL  Total OUT: 2795 mL    Total NET: -1768 mL        I&O's Summary    04 Oct 2018 07:01  -  05 Oct 2018 07:00  --------------------------------------------------------  IN: 1027 mL / OUT: 2795 mL / NET: -1768 mL        PHYSICAL EXAM:  Neurological:    Motor exam:         [x] Upper extremity                 D             B          T          WE       WF      HI                                               R         5/5        5/5        5/5       5/5     5/5       5/5                                               L          5/5        5/5        5/5       5/5     5/5       5/5         [x] Lower extremity                Ps          Ha        Quad    EHL        FHL                                               R        5/5        5/5        5/5       5/5         5/5                                               L         5/5        5/5       5/5       5/5          5/5                                                        [] warm well perfused; capillary refill <3 seconds     Sensation: [x] intact to light touch  [] decreased:     DTR's 1/2           Gait NT    Cardiovascular:  Respiratory:  Gastrointestinal:  Genitourinary:  Extremities:  Incision/Wound: Clean and dry    TUBES/LINES:  [] CVC  [] A-line  [] Lumbar Drain  [] Ventriculostomy  [] Other    DIET:  [] NPO  [] Mechanical  [] Tube feeds    LABS:                        9.7    11.71 )-----------( 186      ( 05 Oct 2018 08:15 )             29.4     10-05    140  |  108  |  52<H>  ----------------------------<  194<H>  5.1   |  22  |  2.33<H>    Ca    8.3<L>      05 Oct 2018 06:03              CAPILLARY BLOOD GLUCOSE      POCT Blood Glucose.: 212 mg/dL (05 Oct 2018 08:23)  POCT Blood Glucose.: 269 mg/dL (04 Oct 2018 21:42)  POCT Blood Glucose.: 351 mg/dL (04 Oct 2018 17:22)  POCT Blood Glucose.: 200 mg/dL (04 Oct 2018 14:51)  POCT Blood Glucose.: 176 mg/dL (04 Oct 2018 12:59)  POCT Blood Glucose.: 163 mg/dL (04 Oct 2018 12:04)  POCT Blood Glucose.: 189 mg/dL (04 Oct 2018 10:55)  POCT Blood Glucose.: 182 mg/dL (04 Oct 2018 10:05)  POCT Blood Glucose.: 192 mg/dL (04 Oct 2018 09:02)          Drug Levels: [] N/A    CSF Analysis: [] N/A      Allergies    No Known Allergies    Intolerances      MEDICATIONS:  Antibiotics:    Neuro:  donepezil 10 milliGRAM(s) Oral at bedtime  gabapentin 300 milliGRAM(s) Oral three times a day  memantine 10 milliGRAM(s) Oral two times a day  methocarbamol 500 milliGRAM(s) Oral every 6 hours PRN  morphine  - Injectable 2 milliGRAM(s) IV Push every 3 hours PRN  oxyCODONE    IR 5 milliGRAM(s) Oral every 3 hours PRN  traZODone 25 milliGRAM(s) Oral daily  venlafaxine XR. 75 milliGRAM(s) Oral daily    Anticoagulation:  aspirin enteric coated 81 milliGRAM(s) Oral daily    OTHER:  amLODIPine   Tablet 5 milliGRAM(s) Oral daily  atorvastatin 80 milliGRAM(s) Oral at bedtime  dextrose 40% Gel 15 Gram(s) Oral once PRN  dextrose 50% Injectable 12.5 Gram(s) IV Push once  dextrose 50% Injectable 25 Gram(s) IV Push once  dextrose 50% Injectable 25 Gram(s) IV Push once  docusate sodium 100 milliGRAM(s) Oral three times a day  famotidine    Tablet 20 milliGRAM(s) Oral daily  glucagon  Injectable 1 milliGRAM(s) IntraMuscular once PRN  influenza   Vaccine 0.5 milliLiter(s) IntraMuscular once  insulin glargine Injectable (LANTUS) 15 Unit(s) SubCutaneous every morning  insulin lispro (HumaLOG) corrective regimen sliding scale   SubCutaneous Before meals and at bedtime  insulin lispro Injectable (HumaLOG) 5 Unit(s) SubCutaneous three times a day before meals  levothyroxine 100 MICROGram(s) Oral daily  losartan 50 milliGRAM(s) Oral daily  metoprolol tartrate 12.5 milliGRAM(s) Oral two times a day  mupirocin 2% Ointment 1 Application(s) Topical two times a day  oxybutynin 5 milliGRAM(s) Oral two times a day  polyethylene glycol 3350 17 Gram(s) Oral two times a day PRN  senna 2 Tablet(s) Oral at bedtime  tamsulosin 0.4 milliGRAM(s) Oral at bedtime    IVF:  cholecalciferol 1000 Unit(s) Oral daily  dextrose 5%. 1000 milliLiter(s) IV Continuous <Continuous>  ferrous    sulfate Liquid 325 milliGRAM(s) Oral daily  multivitamin 1 Tablet(s) Oral daily  sodium bicarbonate 650 milliGRAM(s) Oral two times a day    CULTURES:    RADIOLOGY & ADDITIONAL TESTS:      ASSESSMENT:  HPI:  84 y/o M PMH CKD, BECK, non-compliant with CPAP, CAD, S/P coronary artery stents 2014, abnormal stress test, S/P diagnostic angiogram 8/29/18 without intervention, continuing on current medication regimen, A fib on xarelto, lumbar stenosis, c/o low keiry pain for the past 10 years getting progressively worse.  Presents today for L4-L5 posterior laminectomy, L3-S1 stabilization and fusion. (20 Sep 2018 17:03)    83y Male s/p    PLAN:  NEURO:  CARDIOVASCULAR:  PULMONARY:  RENAL:  GI:  HEME:  ID:  ENDO:    DVT PROPHYLAXIS:  [x] Venodynes                                [x] Heparin/Lovenox    FALL RISK:  [] Low Risk                                    [] Impulsive

## 2018-10-06 LAB
ANION GAP SERPL CALC-SCNC: 11 MMOL/L — SIGNIFICANT CHANGE UP (ref 5–17)
BASOPHILS # BLD AUTO: 0.02 K/UL — SIGNIFICANT CHANGE UP (ref 0–0.2)
BASOPHILS NFR BLD AUTO: 0.2 % — SIGNIFICANT CHANGE UP (ref 0–2)
BUN SERPL-MCNC: 53 MG/DL — HIGH (ref 7–23)
CALCIUM SERPL-MCNC: 8.2 MG/DL — LOW (ref 8.4–10.5)
CHLORIDE SERPL-SCNC: 106 MMOL/L — SIGNIFICANT CHANGE UP (ref 96–108)
CO2 SERPL-SCNC: 22 MMOL/L — SIGNIFICANT CHANGE UP (ref 22–31)
CREAT SERPL-MCNC: 2.33 MG/DL — HIGH (ref 0.5–1.3)
EOSINOPHIL # BLD AUTO: 0.34 K/UL — SIGNIFICANT CHANGE UP (ref 0–0.5)
EOSINOPHIL NFR BLD AUTO: 3.7 % — SIGNIFICANT CHANGE UP (ref 0–6)
GLUCOSE BLDC GLUCOMTR-MCNC: 155 MG/DL — HIGH (ref 70–99)
GLUCOSE BLDC GLUCOMTR-MCNC: 162 MG/DL — HIGH (ref 70–99)
GLUCOSE SERPL-MCNC: 163 MG/DL — HIGH (ref 70–99)
HCT VFR BLD CALC: 29.5 % — LOW (ref 39–50)
HGB BLD-MCNC: 9.2 G/DL — LOW (ref 13–17)
IMM GRANULOCYTES NFR BLD AUTO: 1.8 % — HIGH (ref 0–1.5)
LYMPHOCYTES # BLD AUTO: 1.82 K/UL — SIGNIFICANT CHANGE UP (ref 1–3.3)
LYMPHOCYTES # BLD AUTO: 20 % — SIGNIFICANT CHANGE UP (ref 13–44)
MCHC RBC-ENTMCNC: 26.7 PG — LOW (ref 27–34)
MCHC RBC-ENTMCNC: 31.2 GM/DL — LOW (ref 32–36)
MCV RBC AUTO: 85.8 FL — SIGNIFICANT CHANGE UP (ref 80–100)
MONOCYTES # BLD AUTO: 0.76 K/UL — SIGNIFICANT CHANGE UP (ref 0–0.9)
MONOCYTES NFR BLD AUTO: 8.3 % — SIGNIFICANT CHANGE UP (ref 2–14)
NEUTROPHILS # BLD AUTO: 6.02 K/UL — SIGNIFICANT CHANGE UP (ref 1.8–7.4)
NEUTROPHILS NFR BLD AUTO: 66 % — SIGNIFICANT CHANGE UP (ref 43–77)
PLATELET # BLD AUTO: 177 K/UL — SIGNIFICANT CHANGE UP (ref 150–400)
POTASSIUM SERPL-MCNC: 4.5 MMOL/L — SIGNIFICANT CHANGE UP (ref 3.5–5.3)
POTASSIUM SERPL-SCNC: 4.5 MMOL/L — SIGNIFICANT CHANGE UP (ref 3.5–5.3)
RBC # BLD: 3.44 M/UL — LOW (ref 4.2–5.8)
RBC # FLD: 16 % — HIGH (ref 10.3–14.5)
SODIUM SERPL-SCNC: 139 MMOL/L — SIGNIFICANT CHANGE UP (ref 135–145)
WBC # BLD: 9.12 K/UL — SIGNIFICANT CHANGE UP (ref 3.8–10.5)
WBC # FLD AUTO: 9.12 K/UL — SIGNIFICANT CHANGE UP (ref 3.8–10.5)

## 2018-10-06 RX ORDER — ACETAMINOPHEN 500 MG
650 TABLET ORAL EVERY 6 HOURS
Qty: 0 | Refills: 0 | Status: DISCONTINUED | OUTPATIENT
Start: 2018-10-06 | End: 2018-10-09

## 2018-10-06 RX ADMIN — Medication 1 TABLET(S): at 11:31

## 2018-10-06 RX ADMIN — HEPARIN SODIUM 5000 UNIT(S): 5000 INJECTION INTRAVENOUS; SUBCUTANEOUS at 05:18

## 2018-10-06 RX ADMIN — Medication 2: at 18:19

## 2018-10-06 RX ADMIN — INSULIN GLARGINE 15 UNIT(S): 100 INJECTION, SOLUTION SUBCUTANEOUS at 08:52

## 2018-10-06 RX ADMIN — Medication 5 MILLIGRAM(S): at 11:31

## 2018-10-06 RX ADMIN — Medication 5 MILLIGRAM(S): at 17:17

## 2018-10-06 RX ADMIN — GABAPENTIN 300 MILLIGRAM(S): 400 CAPSULE ORAL at 05:17

## 2018-10-06 RX ADMIN — Medication 650 MILLIGRAM(S): at 18:34

## 2018-10-06 RX ADMIN — Medication 81 MILLIGRAM(S): at 11:30

## 2018-10-06 RX ADMIN — GABAPENTIN 300 MILLIGRAM(S): 400 CAPSULE ORAL at 21:11

## 2018-10-06 RX ADMIN — Medication 2: at 08:51

## 2018-10-06 RX ADMIN — GABAPENTIN 300 MILLIGRAM(S): 400 CAPSULE ORAL at 13:14

## 2018-10-06 RX ADMIN — MEMANTINE HYDROCHLORIDE 10 MILLIGRAM(S): 10 TABLET ORAL at 17:16

## 2018-10-06 RX ADMIN — Medication 100 MILLIGRAM(S): at 21:11

## 2018-10-06 RX ADMIN — Medication 2: at 12:54

## 2018-10-06 RX ADMIN — DONEPEZIL HYDROCHLORIDE 10 MILLIGRAM(S): 10 TABLET, FILM COATED ORAL at 21:10

## 2018-10-06 RX ADMIN — AMLODIPINE BESYLATE 5 MILLIGRAM(S): 2.5 TABLET ORAL at 05:18

## 2018-10-06 RX ADMIN — Medication 650 MILLIGRAM(S): at 17:16

## 2018-10-06 RX ADMIN — Medication 25 MILLIGRAM(S): at 11:31

## 2018-10-06 RX ADMIN — Medication 5 UNIT(S): at 18:15

## 2018-10-06 RX ADMIN — Medication 100 MILLIGRAM(S): at 13:14

## 2018-10-06 RX ADMIN — Medication 650 MILLIGRAM(S): at 05:18

## 2018-10-06 RX ADMIN — LOSARTAN POTASSIUM 50 MILLIGRAM(S): 100 TABLET, FILM COATED ORAL at 05:17

## 2018-10-06 RX ADMIN — Medication 325 MILLIGRAM(S): at 11:30

## 2018-10-06 RX ADMIN — MEMANTINE HYDROCHLORIDE 10 MILLIGRAM(S): 10 TABLET ORAL at 05:18

## 2018-10-06 RX ADMIN — HEPARIN SODIUM 5000 UNIT(S): 5000 INJECTION INTRAVENOUS; SUBCUTANEOUS at 17:16

## 2018-10-06 RX ADMIN — TAMSULOSIN HYDROCHLORIDE 0.4 MILLIGRAM(S): 0.4 CAPSULE ORAL at 21:11

## 2018-10-06 RX ADMIN — Medication 75 MILLIGRAM(S): at 11:30

## 2018-10-06 RX ADMIN — Medication 12.5 MILLIGRAM(S): at 05:18

## 2018-10-06 RX ADMIN — ATORVASTATIN CALCIUM 80 MILLIGRAM(S): 80 TABLET, FILM COATED ORAL at 21:10

## 2018-10-06 RX ADMIN — Medication 5 UNIT(S): at 12:54

## 2018-10-06 RX ADMIN — Medication 1000 UNIT(S): at 11:30

## 2018-10-06 RX ADMIN — FAMOTIDINE 20 MILLIGRAM(S): 10 INJECTION INTRAVENOUS at 11:31

## 2018-10-06 RX ADMIN — Medication 100 MICROGRAM(S): at 05:18

## 2018-10-06 RX ADMIN — Medication 12.5 MILLIGRAM(S): at 17:17

## 2018-10-06 RX ADMIN — Medication 100 MILLIGRAM(S): at 05:17

## 2018-10-06 RX ADMIN — Medication 4: at 22:50

## 2018-10-06 RX ADMIN — Medication 5 UNIT(S): at 08:51

## 2018-10-06 RX ADMIN — SENNA PLUS 2 TABLET(S): 8.6 TABLET ORAL at 21:11

## 2018-10-06 NOTE — PROGRESS NOTE ADULT - SUBJECTIVE AND OBJECTIVE BOX
SUBJECTIVE: Patient seen and examined at bedside. Denies chest pain, shortness of breath, nausea/vomiting. Minimal incisional pain.     Vital Signs Last 24 Hrs  T(C): 37.3 (10-06-18 @ 07:52), Max: 37.3 (10-05-18 @ 23:12)  T(F): 99.1 (10-06-18 @ 07:52), Max: 99.2 (10-06-18 @ 04:34)  HR: 59 (10-06-18 @ 07:52) (59 - 66)  BP: 123/59 (10-06-18 @ 07:52) (113/55 - 165/65)  RR: 18 (10-06-18 @ 07:52) (18 - 18)  SpO2: 96% (10-06-18 @ 07:52) (93% - 97%)    PHYSICAL EXAM:    Constitutional: No Acute Distress, resting comfortably in bed     Neurological: Awake, alert, oriented to person, place and time, speech clear and fluent, face equal, tongue midline, briskly following commands, no drift, moves all extremities with 5/5 strength, sensation intact to light touch throughout, pupils 4mm and reactive bilaterally, extraocular movements intact, no nystagmus    Incision: +low back dressing C/D/I     Drains: +HMV#1 w/ 120cc output / 24 hrs             +HMV#2 w/ 120 cc output / 24 hrs    Pulmonary: Clear to Auscultation, decreased breath sounds at bilateral bases, No rales, No rhonchi, No wheezes     Cardiovascular: S1, S2, Regular rate and rhythm     Gastrointestinal: Soft, Non-tender, Non-distended, +bowel sounds x 4    Extremities: No calf tenderness bilaterally, no cyanosis, clubbing or edema        LABS:                          9.2    9.12  )-----------( 177      ( 06 Oct 2018 07:42 )             29.5    10-06    139  |  106  |  53<H>  ----------------------------<  163<H>  4.5   |  22  |  2.33<H>    Ca    8.2<L>      06 Oct 2018 05:28        10-05 @ 07:01  -  10-06 @ 07:00  --------------------------------------------------------  IN: 520 mL / OUT: 840 mL / NET: -320 mL      IMAGING:     no new imaging today    MEDICATIONS:  Antibiotics:    Neuro:  donepezil 10 milliGRAM(s) Oral at bedtime  gabapentin 300 milliGRAM(s) Oral three times a day  memantine 10 milliGRAM(s) Oral two times a day  methocarbamol 500 milliGRAM(s) Oral every 6 hours PRN Back Spasms  oxyCODONE    IR 5 milliGRAM(s) Oral every 3 hours PRN Moderate Pain (4 - 6)  traZODone 25 milliGRAM(s) Oral daily  venlafaxine XR. 75 milliGRAM(s) Oral daily    Cardiac:  amLODIPine   Tablet 5 milliGRAM(s) Oral daily  losartan 50 milliGRAM(s) Oral daily  metoprolol tartrate 12.5 milliGRAM(s) Oral two times a day  tamsulosin 0.4 milliGRAM(s) Oral at bedtime    Pulm:    GI/:  docusate sodium 100 milliGRAM(s) Oral three times a day  famotidine    Tablet 20 milliGRAM(s) Oral daily  oxybutynin 5 milliGRAM(s) Oral two times a day  polyethylene glycol 3350 17 Gram(s) Oral two times a day PRN Constipation  senna 2 Tablet(s) Oral at bedtime    Other:   aspirin enteric coated 81 milliGRAM(s) Oral daily  atorvastatin 80 milliGRAM(s) Oral at bedtime  cholecalciferol 1000 Unit(s) Oral daily  dextrose 40% Gel 15 Gram(s) Oral once PRN Blood Glucose LESS THAN 70 milliGRAM(s)/deciliter  dextrose 5%. 1000 milliLiter(s) IV Continuous <Continuous>  dextrose 50% Injectable 12.5 Gram(s) IV Push once  dextrose 50% Injectable 25 Gram(s) IV Push once  dextrose 50% Injectable 25 Gram(s) IV Push once  ferrous    sulfate Liquid 325 milliGRAM(s) Oral daily  glucagon  Injectable 1 milliGRAM(s) IntraMuscular once PRN Glucose LESS THAN 70 milligrams/deciliter  heparin  Injectable 5000 Unit(s) SubCutaneous every 12 hours  influenza   Vaccine 0.5 milliLiter(s) IntraMuscular once  insulin glargine Injectable (LANTUS) 15 Unit(s) SubCutaneous every morning  insulin lispro (HumaLOG) corrective regimen sliding scale   SubCutaneous Before meals and at bedtime  insulin lispro Injectable (HumaLOG) 5 Unit(s) SubCutaneous three times a day before meals  levothyroxine 100 MICROGram(s) Oral daily  multivitamin 1 Tablet(s) Oral daily  sodium bicarbonate 650 milliGRAM(s) Oral two times a day        DIET:

## 2018-10-06 NOTE — PROGRESS NOTE ADULT - ASSESSMENT
HPI: 82 y/o M PMH CKD, BECK, non-compliant with CPAP, CAD, S/P coronary artery stents 2014, abnormal stress test, S/P diagnostic angiogram 8/29/18 without intervention, continuing on current medication regimen, A fib on xarelto, lumbar stenosis, c/o low keiry pain for the past 10 years getting progressively worse.  Presented to [facility] on 10/3 for removal of spinal hardware, L4-L5 posterior laminectomy, L3-S1 stabilization and fusion. (20 Sep 2018 17:03)    PROCEDURE: 10/3 removal of spinal hardware, L4-L5 posterior laminectomy, L3-S1 stabilization and fusion; POD#3      PLAN:   -Continue HMVs. Monitor output.   -Pain control w/ Tylenol PRN. Will d/c Oxycodone as patient has not required any doses. Robaxin PRN for muscle spasm.   -Continue Neurontin for neuropathy  -Continue Memantine for hx of dementia  -Continue Trazadone and Effexor for hx of depression/anxiety  -Continue Aspirin 81mg daily for hx of CAD s/p stents in 2014. Xarelto on hold for hx of A.Fib. Continue Metoprolol  -Continue Norvasc, Losartan for hx of HTN  -Continue Sodium Bicarb 650 BID; daily BMP for hx of CKD stage III. Cr stable today. Monitor potassium.   -Continue Synthroid for hx of hypothyroidism  -Continue Lantus 15units qAM and Humalog 5 units TID AC with low dose HISS.   -Encouraged mobilization  -Encouraged incentive spirometer and instructed patient on proper use  -DVT prophylaxis: SQH, bilateral venodynes  -Dispo: PT-[facility]  -Will discuss with Dr. Ramos    [facility] #[id number]

## 2018-10-07 LAB
GLUCOSE BLDC GLUCOMTR-MCNC: 113 MG/DL — HIGH (ref 70–99)
GLUCOSE BLDC GLUCOMTR-MCNC: 125 MG/DL — HIGH (ref 70–99)
GLUCOSE BLDC GLUCOMTR-MCNC: 171 MG/DL — HIGH (ref 70–99)
GLUCOSE BLDC GLUCOMTR-MCNC: 218 MG/DL — HIGH (ref 70–99)
GLUCOSE BLDC GLUCOMTR-MCNC: 237 MG/DL — HIGH (ref 70–99)
GLUCOSE BLDC GLUCOMTR-MCNC: 315 MG/DL — HIGH (ref 70–99)

## 2018-10-07 PROCEDURE — 71045 X-RAY EXAM CHEST 1 VIEW: CPT | Mod: 26

## 2018-10-07 RX ADMIN — Medication 5 MILLIGRAM(S): at 12:36

## 2018-10-07 RX ADMIN — Medication 650 MILLIGRAM(S): at 17:51

## 2018-10-07 RX ADMIN — GABAPENTIN 300 MILLIGRAM(S): 400 CAPSULE ORAL at 15:11

## 2018-10-07 RX ADMIN — SENNA PLUS 2 TABLET(S): 8.6 TABLET ORAL at 21:26

## 2018-10-07 RX ADMIN — Medication 1 TABLET(S): at 12:35

## 2018-10-07 RX ADMIN — Medication 25 MILLIGRAM(S): at 12:38

## 2018-10-07 RX ADMIN — LOSARTAN POTASSIUM 50 MILLIGRAM(S): 100 TABLET, FILM COATED ORAL at 05:43

## 2018-10-07 RX ADMIN — Medication 5 UNIT(S): at 17:49

## 2018-10-07 RX ADMIN — GABAPENTIN 300 MILLIGRAM(S): 400 CAPSULE ORAL at 05:43

## 2018-10-07 RX ADMIN — Medication 100 MILLIGRAM(S): at 15:11

## 2018-10-07 RX ADMIN — Medication 5 UNIT(S): at 12:47

## 2018-10-07 RX ADMIN — HEPARIN SODIUM 5000 UNIT(S): 5000 INJECTION INTRAVENOUS; SUBCUTANEOUS at 05:43

## 2018-10-07 RX ADMIN — Medication 4: at 09:01

## 2018-10-07 RX ADMIN — Medication 100 MILLIGRAM(S): at 05:43

## 2018-10-07 RX ADMIN — Medication 8: at 12:46

## 2018-10-07 RX ADMIN — GABAPENTIN 300 MILLIGRAM(S): 400 CAPSULE ORAL at 21:26

## 2018-10-07 RX ADMIN — Medication 75 MILLIGRAM(S): at 12:35

## 2018-10-07 RX ADMIN — TAMSULOSIN HYDROCHLORIDE 0.4 MILLIGRAM(S): 0.4 CAPSULE ORAL at 21:27

## 2018-10-07 RX ADMIN — Medication 5 MILLIGRAM(S): at 17:51

## 2018-10-07 RX ADMIN — AMLODIPINE BESYLATE 5 MILLIGRAM(S): 2.5 TABLET ORAL at 05:43

## 2018-10-07 RX ADMIN — Medication 81 MILLIGRAM(S): at 12:34

## 2018-10-07 RX ADMIN — Medication 325 MILLIGRAM(S): at 12:34

## 2018-10-07 RX ADMIN — MEMANTINE HYDROCHLORIDE 10 MILLIGRAM(S): 10 TABLET ORAL at 05:43

## 2018-10-07 RX ADMIN — Medication 1000 UNIT(S): at 12:36

## 2018-10-07 RX ADMIN — MEMANTINE HYDROCHLORIDE 10 MILLIGRAM(S): 10 TABLET ORAL at 17:50

## 2018-10-07 RX ADMIN — INSULIN GLARGINE 15 UNIT(S): 100 INJECTION, SOLUTION SUBCUTANEOUS at 09:06

## 2018-10-07 RX ADMIN — Medication 100 MICROGRAM(S): at 05:43

## 2018-10-07 RX ADMIN — DONEPEZIL HYDROCHLORIDE 10 MILLIGRAM(S): 10 TABLET, FILM COATED ORAL at 21:26

## 2018-10-07 RX ADMIN — Medication 100 MILLIGRAM(S): at 21:25

## 2018-10-07 RX ADMIN — FAMOTIDINE 20 MILLIGRAM(S): 10 INJECTION INTRAVENOUS at 12:37

## 2018-10-07 RX ADMIN — Medication 5 UNIT(S): at 09:02

## 2018-10-07 RX ADMIN — Medication 650 MILLIGRAM(S): at 21:57

## 2018-10-07 RX ADMIN — HEPARIN SODIUM 5000 UNIT(S): 5000 INJECTION INTRAVENOUS; SUBCUTANEOUS at 17:50

## 2018-10-07 RX ADMIN — Medication 650 MILLIGRAM(S): at 05:43

## 2018-10-07 RX ADMIN — Medication 12.5 MILLIGRAM(S): at 17:51

## 2018-10-07 RX ADMIN — Medication 12.5 MILLIGRAM(S): at 05:43

## 2018-10-07 RX ADMIN — ATORVASTATIN CALCIUM 80 MILLIGRAM(S): 80 TABLET, FILM COATED ORAL at 21:25

## 2018-10-07 NOTE — PROGRESS NOTE ADULT - ASSESSMENT
HPI: 82 y/o M PMH CKD, BECK, non-compliant with CPAP, CAD, S/P coronary artery stents 2014, abnormal stress test, S/P diagnostic angiogram 8/29/18 without intervention, continuing on current medication regimen, A fib on xarelto, lumbar stenosis, c/o low keiry pain for the past 10 years getting progressively worse.  Presented to [facility] on 10/3 for removal of spinal hardware, L4-L5 posterior laminectomy, L3-S1 stabilization and fusion. (20 Sep 2018 17:03)    PROCEDURE: 10/3 removal of spinal hardware, L4-L5 posterior laminectomy, L3-S1 stabilization and fusion; POD#4      PLAN:   -Continue HMVs. Monitor output.   -Pain control w/ Tylenol PRN. Robaxin PRN for muscle spasm.   -Continue Neurontin for neuropathy  -Continue Memantine for hx of dementia  -Continue Trazadone and Effexor for hx of depression/anxiety  -Continue Aspirin 81mg daily for hx of CAD s/p stents in 2014. Xarelto on hold for hx of A.Fib. Continue Metoprolol  -Continue Norvasc, Losartan for hx of HTN. BP currently well controlled.  -Continue Sodium Bicarb 650 BID; daily BMP for hx of CKD stage III.   -Continue Synthroid for hx of hypothyroidism  -Continue Lantus 15units qAM and Humalog 5 units TID AC with low dose HISS.   -Encouraged mobilization  -Encouraged incentive spirometer and instructed patient on proper use  -DVT prophylaxis: SQH, bilateral venodynes  -Dispo: PT-[facility]  -Will discuss with Dr. Ramos    [facility] #[id number]

## 2018-10-07 NOTE — PROGRESS NOTE ADULT - SUBJECTIVE AND OBJECTIVE BOX
SUBJECTIVE: Patient seen and examined at bedside. Mild hypoxia overnight (spo2 92%); patient has history of BECK but refusing CPAP. Denies chest pain, shortness of breath, nausea/vomiting. Minimal incisional pain.     Vital Signs Last 24 Hrs  T(C): 37.1 (10-07-18 @ 07:53), Max: 37.1 (10-06-18 @ 15:36)  T(F): 98.8 (10-07-18 @ 07:53), Max: 98.8 (10-06-18 @ 23:54)  HR: 62 (10-07-18 @ 07:53) (58 - 90)  BP: 122/66 (10-07-18 @ 07:53) (114/65 - 158/66)  RR: 18 (10-07-18 @ 07:53) (18 - 18)  SpO2: 93% (10-07-18 @ 07:53) (92% - 96%)    PHYSICAL EXAM:    Constitutional: No Acute Distress, resting comfortably in bed     Neurological: Awake, alert, oriented to person, place and time, speech clear and fluent, face equal, tongue midline, briskly following commands, no drift, moves all extremities with 5/5 strength, sensation intact to light touch throughout, pupils 4mm and reactive bilaterally, extraocular movements intact, no nystagmus    Incision: +low back dressing C/D/I     Drains: +HMV#1 w/ 150cc output / 24 hrs             +HMV#2 w/ 75cc output / 24 hrs    Pulmonary: Clear to Auscultation, decreased breath sounds at bilateral bases, No rales, No rhonchi, No wheezes     Cardiovascular: S1, S2, Regular rate and rhythm     Gastrointestinal: Soft, Non-tender, Non-distended, +bowel sounds x 4    Extremities: No calf tenderness bilaterally, no cyanosis, clubbing or edema        LABS:                          9.2    9.12  )-----------( 177      ( 06 Oct 2018 07:42 )             29.5    10-06    139  |  106  |  53<H>  ----------------------------<  163<H>  4.5   |  22  |  2.33<H>    Ca    8.2<L>      06 Oct 2018 05:28        10-05 @ 07:01  -  10-06 @ 07:00  --------------------------------------------------------  IN: 520 mL / OUT: 840 mL / NET: -320 mL      IMAGING:     no new imaging today    MEDICATIONS:  Antibiotics:    Neuro:  donepezil 10 milliGRAM(s) Oral at bedtime  gabapentin 300 milliGRAM(s) Oral three times a day  memantine 10 milliGRAM(s) Oral two times a day  methocarbamol 500 milliGRAM(s) Oral every 6 hours PRN Back Spasms  oxyCODONE    IR 5 milliGRAM(s) Oral every 3 hours PRN Moderate Pain (4 - 6)  traZODone 25 milliGRAM(s) Oral daily  venlafaxine XR. 75 milliGRAM(s) Oral daily    Cardiac:  amLODIPine   Tablet 5 milliGRAM(s) Oral daily  losartan 50 milliGRAM(s) Oral daily  metoprolol tartrate 12.5 milliGRAM(s) Oral two times a day  tamsulosin 0.4 milliGRAM(s) Oral at bedtime    Pulm:    GI/:  docusate sodium 100 milliGRAM(s) Oral three times a day  famotidine    Tablet 20 milliGRAM(s) Oral daily  oxybutynin 5 milliGRAM(s) Oral two times a day  polyethylene glycol 3350 17 Gram(s) Oral two times a day PRN Constipation  senna 2 Tablet(s) Oral at bedtime    Other:   aspirin enteric coated 81 milliGRAM(s) Oral daily  atorvastatin 80 milliGRAM(s) Oral at bedtime  cholecalciferol 1000 Unit(s) Oral daily  dextrose 40% Gel 15 Gram(s) Oral once PRN Blood Glucose LESS THAN 70 milliGRAM(s)/deciliter  dextrose 5%. 1000 milliLiter(s) IV Continuous <Continuous>  dextrose 50% Injectable 12.5 Gram(s) IV Push once  dextrose 50% Injectable 25 Gram(s) IV Push once  dextrose 50% Injectable 25 Gram(s) IV Push once  ferrous    sulfate Liquid 325 milliGRAM(s) Oral daily  glucagon  Injectable 1 milliGRAM(s) IntraMuscular once PRN Glucose LESS THAN 70 milligrams/deciliter  heparin  Injectable 5000 Unit(s) SubCutaneous every 12 hours  influenza   Vaccine 0.5 milliLiter(s) IntraMuscular once  insulin glargine Injectable (LANTUS) 15 Unit(s) SubCutaneous every morning  insulin lispro (HumaLOG) corrective regimen sliding scale   SubCutaneous Before meals and at bedtime  insulin lispro Injectable (HumaLOG) 5 Unit(s) SubCutaneous three times a day before meals  levothyroxine 100 MICROGram(s) Oral daily  multivitamin 1 Tablet(s) Oral daily  sodium bicarbonate 650 milliGRAM(s) Oral two times a day        DIET: consistent carbohydrate

## 2018-10-08 DIAGNOSIS — R50.82 POSTPROCEDURAL FEVER: ICD-10-CM

## 2018-10-08 DIAGNOSIS — E03.9 HYPOTHYROIDISM, UNSPECIFIED: ICD-10-CM

## 2018-10-08 DIAGNOSIS — N18.4 CHRONIC KIDNEY DISEASE, STAGE 4 (SEVERE): ICD-10-CM

## 2018-10-08 LAB
ANION GAP SERPL CALC-SCNC: 9 MMOL/L — SIGNIFICANT CHANGE UP (ref 5–17)
APPEARANCE UR: CLEAR — SIGNIFICANT CHANGE UP
BILIRUB UR-MCNC: NEGATIVE — SIGNIFICANT CHANGE UP
BUN SERPL-MCNC: 56 MG/DL — HIGH (ref 7–23)
CALCIUM SERPL-MCNC: 8.2 MG/DL — LOW (ref 8.4–10.5)
CHLORIDE SERPL-SCNC: 104 MMOL/L — SIGNIFICANT CHANGE UP (ref 96–108)
CO2 SERPL-SCNC: 22 MMOL/L — SIGNIFICANT CHANGE UP (ref 22–31)
COLOR SPEC: YELLOW — SIGNIFICANT CHANGE UP
CREAT SERPL-MCNC: 2.33 MG/DL — HIGH (ref 0.5–1.3)
D DIMER BLD IA.RAPID-MCNC: 858 NG/ML DDU — HIGH
DIFF PNL FLD: NEGATIVE — SIGNIFICANT CHANGE UP
GLUCOSE BLDC GLUCOMTR-MCNC: 149 MG/DL — HIGH (ref 70–99)
GLUCOSE BLDC GLUCOMTR-MCNC: 153 MG/DL — HIGH (ref 70–99)
GLUCOSE BLDC GLUCOMTR-MCNC: 186 MG/DL — HIGH (ref 70–99)
GLUCOSE BLDC GLUCOMTR-MCNC: 203 MG/DL — HIGH (ref 70–99)
GLUCOSE BLDC GLUCOMTR-MCNC: 213 MG/DL — HIGH (ref 70–99)
GLUCOSE SERPL-MCNC: 201 MG/DL — HIGH (ref 70–99)
GLUCOSE UR QL: NEGATIVE MG/DL — SIGNIFICANT CHANGE UP
HCT VFR BLD CALC: 28.2 % — LOW (ref 39–50)
HGB BLD-MCNC: 9.4 G/DL — LOW (ref 13–17)
KETONES UR-MCNC: NEGATIVE — SIGNIFICANT CHANGE UP
LEUKOCYTE ESTERASE UR-ACNC: NEGATIVE — SIGNIFICANT CHANGE UP
MCHC RBC-ENTMCNC: 28.6 PG — SIGNIFICANT CHANGE UP (ref 27–34)
MCHC RBC-ENTMCNC: 33.5 GM/DL — SIGNIFICANT CHANGE UP (ref 32–36)
MCV RBC AUTO: 85.4 FL — SIGNIFICANT CHANGE UP (ref 80–100)
NITRITE UR-MCNC: NEGATIVE — SIGNIFICANT CHANGE UP
PH UR: 6 — SIGNIFICANT CHANGE UP (ref 5–8)
PLATELET # BLD AUTO: 227 K/UL — SIGNIFICANT CHANGE UP (ref 150–400)
POTASSIUM SERPL-MCNC: 4.3 MMOL/L — SIGNIFICANT CHANGE UP (ref 3.5–5.3)
POTASSIUM SERPL-SCNC: 4.3 MMOL/L — SIGNIFICANT CHANGE UP (ref 3.5–5.3)
PROT UR-MCNC: 30 MG/DL
RBC # BLD: 3.3 M/UL — LOW (ref 4.2–5.8)
RBC # FLD: 14.6 % — HIGH (ref 10.3–14.5)
SODIUM SERPL-SCNC: 135 MMOL/L — SIGNIFICANT CHANGE UP (ref 135–145)
SP GR SPEC: 1.01 — SIGNIFICANT CHANGE UP (ref 1.01–1.02)
UROBILINOGEN FLD QL: NEGATIVE MG/DL — SIGNIFICANT CHANGE UP
WBC # BLD: 7.8 K/UL — SIGNIFICANT CHANGE UP (ref 3.8–10.5)
WBC # FLD AUTO: 7.8 K/UL — SIGNIFICANT CHANGE UP (ref 3.8–10.5)

## 2018-10-08 PROCEDURE — 99233 SBSQ HOSP IP/OBS HIGH 50: CPT

## 2018-10-08 RX ORDER — INSULIN GLARGINE 100 [IU]/ML
18 INJECTION, SOLUTION SUBCUTANEOUS EVERY MORNING
Qty: 0 | Refills: 0 | Status: DISCONTINUED | OUTPATIENT
Start: 2018-10-08 | End: 2018-10-09

## 2018-10-08 RX ORDER — INSULIN LISPRO 100/ML
VIAL (ML) SUBCUTANEOUS
Qty: 0 | Refills: 0 | Status: DISCONTINUED | OUTPATIENT
Start: 2018-10-08 | End: 2018-10-09

## 2018-10-08 RX ADMIN — Medication 75 MILLIGRAM(S): at 18:00

## 2018-10-08 RX ADMIN — Medication 100 MILLIGRAM(S): at 13:37

## 2018-10-08 RX ADMIN — Medication 5 UNIT(S): at 18:00

## 2018-10-08 RX ADMIN — Medication 325 MILLIGRAM(S): at 13:38

## 2018-10-08 RX ADMIN — INSULIN GLARGINE 15 UNIT(S): 100 INJECTION, SOLUTION SUBCUTANEOUS at 08:45

## 2018-10-08 RX ADMIN — SENNA PLUS 2 TABLET(S): 8.6 TABLET ORAL at 21:32

## 2018-10-08 RX ADMIN — Medication 5 UNIT(S): at 13:38

## 2018-10-08 RX ADMIN — Medication 1000 UNIT(S): at 13:38

## 2018-10-08 RX ADMIN — Medication 25 MILLIGRAM(S): at 17:58

## 2018-10-08 RX ADMIN — GABAPENTIN 300 MILLIGRAM(S): 400 CAPSULE ORAL at 21:32

## 2018-10-08 RX ADMIN — Medication 650 MILLIGRAM(S): at 17:57

## 2018-10-08 RX ADMIN — ATORVASTATIN CALCIUM 80 MILLIGRAM(S): 80 TABLET, FILM COATED ORAL at 21:31

## 2018-10-08 RX ADMIN — HEPARIN SODIUM 5000 UNIT(S): 5000 INJECTION INTRAVENOUS; SUBCUTANEOUS at 06:30

## 2018-10-08 RX ADMIN — Medication 5 UNIT(S): at 08:45

## 2018-10-08 RX ADMIN — FAMOTIDINE 20 MILLIGRAM(S): 10 INJECTION INTRAVENOUS at 13:38

## 2018-10-08 RX ADMIN — Medication 100 MILLIGRAM(S): at 06:30

## 2018-10-08 RX ADMIN — Medication 5 MILLIGRAM(S): at 17:58

## 2018-10-08 RX ADMIN — AMLODIPINE BESYLATE 5 MILLIGRAM(S): 2.5 TABLET ORAL at 04:43

## 2018-10-08 RX ADMIN — Medication 1 TABLET(S): at 13:38

## 2018-10-08 RX ADMIN — Medication 650 MILLIGRAM(S): at 04:43

## 2018-10-08 RX ADMIN — GABAPENTIN 300 MILLIGRAM(S): 400 CAPSULE ORAL at 13:38

## 2018-10-08 RX ADMIN — HEPARIN SODIUM 5000 UNIT(S): 5000 INJECTION INTRAVENOUS; SUBCUTANEOUS at 17:58

## 2018-10-08 RX ADMIN — Medication 650 MILLIGRAM(S): at 04:44

## 2018-10-08 RX ADMIN — LOSARTAN POTASSIUM 50 MILLIGRAM(S): 100 TABLET, FILM COATED ORAL at 04:43

## 2018-10-08 RX ADMIN — MEMANTINE HYDROCHLORIDE 10 MILLIGRAM(S): 10 TABLET ORAL at 04:44

## 2018-10-08 RX ADMIN — DONEPEZIL HYDROCHLORIDE 10 MILLIGRAM(S): 10 TABLET, FILM COATED ORAL at 21:33

## 2018-10-08 RX ADMIN — Medication 4: at 08:44

## 2018-10-08 RX ADMIN — Medication 100 MICROGRAM(S): at 04:43

## 2018-10-08 RX ADMIN — GABAPENTIN 300 MILLIGRAM(S): 400 CAPSULE ORAL at 06:30

## 2018-10-08 RX ADMIN — Medication 2: at 17:59

## 2018-10-08 RX ADMIN — MEMANTINE HYDROCHLORIDE 10 MILLIGRAM(S): 10 TABLET ORAL at 17:58

## 2018-10-08 RX ADMIN — Medication 5 MILLIGRAM(S): at 13:38

## 2018-10-08 RX ADMIN — Medication 12.5 MILLIGRAM(S): at 04:44

## 2018-10-08 RX ADMIN — Medication 12.5 MILLIGRAM(S): at 17:57

## 2018-10-08 RX ADMIN — TAMSULOSIN HYDROCHLORIDE 0.4 MILLIGRAM(S): 0.4 CAPSULE ORAL at 21:32

## 2018-10-08 RX ADMIN — Medication 100 MILLIGRAM(S): at 21:31

## 2018-10-08 RX ADMIN — Medication 81 MILLIGRAM(S): at 13:38

## 2018-10-08 NOTE — PROGRESS NOTE ADULT - ASSESSMENT
HPI: 82 y/o M PMH CKD, BECK, non-compliant with CPAP, CAD, S/P coronary artery stents 2014, abnormal stress test, S/P diagnostic angiogram 8/29/18 without intervention, continuing on current medication regimen, A fib on xarelto, lumbar stenosis, c/o low keiry pain for the past 10 years getting progressively worse.  Presented to [facility] on 10/3 for removal of spinal hardware, L4-L5 posterior laminectomy, L3-S1 stabilization and fusion. (20 Sep 2018 17:03)    PROCEDURE: 10/3 removal of spinal hardware, L4-L5 posterior laminectomy, L3-S1 stabilization and fusion; POD#5      PLAN:   -Will d/c HMVs today  -Fever w/up pending; UA negative; BCx-P. CXR without focal consolidation but +atelectasis.  -Pain control w/ Tylenol PRN. Robaxin PRN for muscle spasm.   -Continue Neurontin for neuropathy  -Continue Memantine for hx of dementia  -Continue Trazadone and Effexor for hx of depression/anxiety  -Continue Aspirin 81mg daily for hx of CAD s/p stents in 2014. Xarelto on hold for hx of A.Fib. Continue Metoprolol  -Continue Norvasc, Losartan for hx of HTN. BP currently well controlled.  -Continue Sodium Bicarb 650 BID; daily BMP for hx of CKD stage III.   -Continue Synthroid for hx of hypothyroidism  -Lantus increased to 18units qAM and Humalog 5 units TID AC with low dose HISS.   -Encouraged mobilization  -Encouraged incentive spirometer and instructed patient on proper use. Explained to him the importance of using the incentive spirometer 10 x every hour and he expresses understanding  -DVT prophylaxis: SQH, bilateral venodynes  -Dispo: PT-[facility]  -Will discuss with Dr. Ramos    [facility] #[id number]

## 2018-10-08 NOTE — PROGRESS NOTE ADULT - SUBJECTIVE AND OBJECTIVE BOX
SUBJECTIVE: Patient seen and examined at bedside. Low grade fever to 100.6 overnight. Denies chest pain, shortness of breath, nausea/vomiting. Minimal incisional pain. +large bowel movement today    Vital Signs Last 24 Hrs  T(C): 36.6 (10-08-18 @ 07:43), Max: 38.1 (10-07-18 @ 19:47)  T(F): 97.8 (10-08-18 @ 07:43), Max: 100.6 (10-07-18 @ 19:47)  HR: 61 (10-08-18 @ 07:43) (61 - 85)  BP: 132/68 (10-08-18 @ 07:43) (126/70 - 141/65)  RR: 18 (10-08-18 @ 07:43) (18 - 18)  SpO2: 93% (10-08-18 @ 07:43) (90% - 96%)    PHYSICAL EXAM:    Constitutional: No Acute Distress, resting comfortably in bed     Neurological: Awake, alert, oriented to person, place and time, speech clear and fluent, face equal, tongue midline, briskly following commands, no drift, moves all extremities with 5/5 strength, sensation intact to light touch throughout, pupils 4mm and reactive bilaterally, extraocular movements intact, no nystagmus    Incision: +low back dressing C/D/I     Drains: +HMV#1 w/ 48cc output / 24 hrs             +HMV#2 w/ 20cc output / 24 hrs    Pulmonary: Clear to Auscultation, decreased breath sounds at bilateral bases, No rales, No rhonchi, No wheezes     Cardiovascular: S1, S2, Regular rate and rhythm     Gastrointestinal: Soft, Non-tender, Non-distended, +bowel sounds x 4    Extremities: No calf tenderness bilaterally, no cyanosis, clubbing or edema        LABS:                          9.4    7.8   )-----------( 227      ( 08 Oct 2018 06:27 )             28.2   10-08    135  |  104  |  56<H>  ----------------------------<  201<H>  4.3   |  22  |  2.33<H>    Ca    8.2<L>      08 Oct 2018 06:27          IMAGING:     < from: Xray Chest 1 View- PORTABLE-Urgent (10.07.18 @ 11:08) >  INTERPRETATION:  A single chest x-ray was obtained on October 7, 2018.    Indication: Status post surgery. Hypoxia.    Impression:    The heart is normal in size. Platelike atelectasis left lower lobe. The   right lung is clear. Degenerative changes of the thoracic spine.    < end of copied text >      MEDICATIONS  (STANDING):  amLODIPine   Tablet 5 milliGRAM(s) Oral daily  aspirin enteric coated 81 milliGRAM(s) Oral daily  atorvastatin 80 milliGRAM(s) Oral at bedtime  cholecalciferol 1000 Unit(s) Oral daily  dextrose 5%. 1000 milliLiter(s) (50 mL/Hr) IV Continuous <Continuous>  dextrose 50% Injectable 12.5 Gram(s) IV Push once  dextrose 50% Injectable 25 Gram(s) IV Push once  dextrose 50% Injectable 25 Gram(s) IV Push once  docusate sodium 100 milliGRAM(s) Oral three times a day  donepezil 10 milliGRAM(s) Oral at bedtime  famotidine    Tablet 20 milliGRAM(s) Oral daily  ferrous    sulfate Liquid 325 milliGRAM(s) Oral daily  gabapentin 300 milliGRAM(s) Oral three times a day  heparin  Injectable 5000 Unit(s) SubCutaneous every 12 hours  influenza   Vaccine 0.5 milliLiter(s) IntraMuscular once  insulin glargine Injectable (LANTUS) 18 Unit(s) SubCutaneous every morning  insulin lispro (HumaLOG) corrective regimen sliding scale   SubCutaneous three times a day before meals  insulin lispro Injectable (HumaLOG) 5 Unit(s) SubCutaneous three times a day before meals  levothyroxine 100 MICROGram(s) Oral daily  losartan 50 milliGRAM(s) Oral daily  memantine 10 milliGRAM(s) Oral two times a day  metoprolol tartrate 12.5 milliGRAM(s) Oral two times a day  multivitamin 1 Tablet(s) Oral daily  oxybutynin 5 milliGRAM(s) Oral two times a day  senna 2 Tablet(s) Oral at bedtime  sodium bicarbonate 650 milliGRAM(s) Oral two times a day  tamsulosin 0.4 milliGRAM(s) Oral at bedtime  traZODone 25 milliGRAM(s) Oral daily  venlafaxine XR. 75 milliGRAM(s) Oral daily    MEDICATIONS  (PRN):  acetaminophen   Tablet .. 650 milliGRAM(s) Oral every 6 hours PRN Temp greater or equal to 38C (100.4F), Mild Pain (1 - 3)  acetaminophen   Tablet .. 650 milliGRAM(s) Oral every 6 hours PRN Mild Pain (1 - 3)  dextrose 40% Gel 15 Gram(s) Oral once PRN Blood Glucose LESS THAN 70 milliGRAM(s)/deciliter  glucagon  Injectable 1 milliGRAM(s) IntraMuscular once PRN Glucose LESS THAN 70 milligrams/deciliter  methocarbamol 500 milliGRAM(s) Oral every 6 hours PRN Back Spasms  polyethylene glycol 3350 17 Gram(s) Oral two times a day PRN Constipation          DIET: consistent carbohydrate

## 2018-10-08 NOTE — PROGRESS NOTE ADULT - PROBLEM SELECTOR PLAN 3
baseline creatinine appears ~2.5 based on previous blood work in system  creatinine at baseline now   c/w losartan  would continue to monitor and avoid nephrotoxic medications

## 2018-10-08 NOTE — CHART NOTE - NSCHARTNOTEFT_GEN_A_CORE
HMV#1 with 48 cc output / 24 hrs and HMV #2 with 20 cc output / 24 hrs. Drains taken off suction and removed without resistance or difficulty. Exit sites cleansed with chlorhexidine. Dressing placed at each exit site. Patient tolerated procedure well.

## 2018-10-08 NOTE — PROGRESS NOTE ADULT - SUBJECTIVE AND OBJECTIVE BOX
Patient is a 83y old  Male who presents with a chief complaint of Admitted 10/3 s/p removal of spinal hardware, L4-5 laminectomy, L3-S1 instrumentation and fusion with iliac crest bone graft (07 Oct 2018 10:08)        SUBJECTIVE / OVERNIGHT EVENTS:      MEDICATIONS  (STANDING):  amLODIPine   Tablet 5 milliGRAM(s) Oral daily  aspirin enteric coated 81 milliGRAM(s) Oral daily  atorvastatin 80 milliGRAM(s) Oral at bedtime  cholecalciferol 1000 Unit(s) Oral daily  dextrose 5%. 1000 milliLiter(s) (50 mL/Hr) IV Continuous <Continuous>  dextrose 50% Injectable 12.5 Gram(s) IV Push once  dextrose 50% Injectable 25 Gram(s) IV Push once  dextrose 50% Injectable 25 Gram(s) IV Push once  docusate sodium 100 milliGRAM(s) Oral three times a day  donepezil 10 milliGRAM(s) Oral at bedtime  famotidine    Tablet 20 milliGRAM(s) Oral daily  ferrous    sulfate Liquid 325 milliGRAM(s) Oral daily  gabapentin 300 milliGRAM(s) Oral three times a day  heparin  Injectable 5000 Unit(s) SubCutaneous every 12 hours  influenza   Vaccine 0.5 milliLiter(s) IntraMuscular once  insulin glargine Injectable (LANTUS) 15 Unit(s) SubCutaneous every morning  insulin lispro (HumaLOG) corrective regimen sliding scale   SubCutaneous Before meals and at bedtime  insulin lispro Injectable (HumaLOG) 5 Unit(s) SubCutaneous three times a day before meals  levothyroxine 100 MICROGram(s) Oral daily  losartan 50 milliGRAM(s) Oral daily  memantine 10 milliGRAM(s) Oral two times a day  metoprolol tartrate 12.5 milliGRAM(s) Oral two times a day  multivitamin 1 Tablet(s) Oral daily  oxybutynin 5 milliGRAM(s) Oral two times a day  senna 2 Tablet(s) Oral at bedtime  sodium bicarbonate 650 milliGRAM(s) Oral two times a day  tamsulosin 0.4 milliGRAM(s) Oral at bedtime  traZODone 25 milliGRAM(s) Oral daily  venlafaxine XR. 75 milliGRAM(s) Oral daily    MEDICATIONS  (PRN):  acetaminophen   Tablet .. 650 milliGRAM(s) Oral every 6 hours PRN Temp greater or equal to 38C (100.4F), Mild Pain (1 - 3)  acetaminophen   Tablet .. 650 milliGRAM(s) Oral every 6 hours PRN Mild Pain (1 - 3)  dextrose 40% Gel 15 Gram(s) Oral once PRN Blood Glucose LESS THAN 70 milliGRAM(s)/deciliter  glucagon  Injectable 1 milliGRAM(s) IntraMuscular once PRN Glucose LESS THAN 70 milligrams/deciliter  methocarbamol 500 milliGRAM(s) Oral every 6 hours PRN Back Spasms  polyethylene glycol 3350 17 Gram(s) Oral two times a day PRN Constipation      Vital Signs Last 24 Hrs  T(C): 36.6 (08 Oct 2018 07:43), Max: 38.1 (07 Oct 2018 19:47)  T(F): 97.8 (08 Oct 2018 07:43), Max: 100.6 (07 Oct 2018 19:47)  HR: 61 (08 Oct 2018 07:43) (61 - 85)  BP: 132/68 (08 Oct 2018 07:43) (126/70 - 141/65)  BP(mean): --  RR: 18 (08 Oct 2018 07:43) (18 - 18)  SpO2: 93% (08 Oct 2018 07:43) (90% - 96%)  CAPILLARY BLOOD GLUCOSE      POCT Blood Glucose.: 213 mg/dL (08 Oct 2018 08:30)  POCT Blood Glucose.: 125 mg/dL (07 Oct 2018 21:19)  POCT Blood Glucose.: 113 mg/dL (07 Oct 2018 17:17)  POCT Blood Glucose.: 315 mg/dL (07 Oct 2018 12:37)    I&O's Summary    07 Oct 2018 07:01  -  08 Oct 2018 07:00  --------------------------------------------------------  IN: 800 mL / OUT: 868 mL / NET: -68 mL        PHYSICAL EXAM:  GENERAL: NAD  HEAD:  Atraumatic, Normocephalic  EYES: conjunctiva and sclera clear  NECK: No JVD  CHEST/LUNG: CTA b/l  HEART: S1 S2 RRR  ABDOMEN: +BS Soft, NT/ND  EXTREMITIES:  2+ DP Pulses, No c/c/e  NEUROLOGY: AAOx3, no focal deficits   SKIN: No rashes or lesions    LABS:                        9.4    7.8   )-----------( 227      ( 08 Oct 2018 06:27 )             28.2     10-08    135  |  104  |  56<H>  ----------------------------<  201<H>  4.3   |  22  |  2.33<H>    Ca    8.2<L>      08 Oct 2018 06:27            Urinalysis Basic - ( 08 Oct 2018 10:34 )    Color: Yellow / Appearance: Clear / S.013 / pH: x  Gluc: x / Ketone: Negative  / Bili: Negative / Urobili: Negative mg/dL   Blood: x / Protein: 30 mg/dL / Nitrite: Negative   Leuk Esterase: Negative / RBC: 2 /HPF / WBC 1 /HPF   Sq Epi: x / Non Sq Epi: 0 /HPF / Bacteria: Negative        RADIOLOGY & ADDITIONAL TESTS:    Imaging Personally Reviewed:  Consultant(s) Notes Reviewed:    Care Discussed with Consultants/Other Providers: Patient is a 83y old  Male who presents with a chief complaint of Admitted 10/3 s/p removal of spinal hardware, L4-5 laminectomy, L3-S1 instrumentation and fusion with iliac crest bone graft (07 Oct 2018 10:08)        SUBJECTIVE / OVERNIGHT EVENTS: no acute complaints. just had large bowel movement      MEDICATIONS  (STANDING):  amLODIPine   Tablet 5 milliGRAM(s) Oral daily  aspirin enteric coated 81 milliGRAM(s) Oral daily  atorvastatin 80 milliGRAM(s) Oral at bedtime  cholecalciferol 1000 Unit(s) Oral daily  dextrose 5%. 1000 milliLiter(s) (50 mL/Hr) IV Continuous <Continuous>  dextrose 50% Injectable 12.5 Gram(s) IV Push once  dextrose 50% Injectable 25 Gram(s) IV Push once  dextrose 50% Injectable 25 Gram(s) IV Push once  docusate sodium 100 milliGRAM(s) Oral three times a day  donepezil 10 milliGRAM(s) Oral at bedtime  famotidine    Tablet 20 milliGRAM(s) Oral daily  ferrous    sulfate Liquid 325 milliGRAM(s) Oral daily  gabapentin 300 milliGRAM(s) Oral three times a day  heparin  Injectable 5000 Unit(s) SubCutaneous every 12 hours  influenza   Vaccine 0.5 milliLiter(s) IntraMuscular once  insulin glargine Injectable (LANTUS) 15 Unit(s) SubCutaneous every morning  insulin lispro (HumaLOG) corrective regimen sliding scale   SubCutaneous Before meals and at bedtime  insulin lispro Injectable (HumaLOG) 5 Unit(s) SubCutaneous three times a day before meals  levothyroxine 100 MICROGram(s) Oral daily  losartan 50 milliGRAM(s) Oral daily  memantine 10 milliGRAM(s) Oral two times a day  metoprolol tartrate 12.5 milliGRAM(s) Oral two times a day  multivitamin 1 Tablet(s) Oral daily  oxybutynin 5 milliGRAM(s) Oral two times a day  senna 2 Tablet(s) Oral at bedtime  sodium bicarbonate 650 milliGRAM(s) Oral two times a day  tamsulosin 0.4 milliGRAM(s) Oral at bedtime  traZODone 25 milliGRAM(s) Oral daily  venlafaxine XR. 75 milliGRAM(s) Oral daily    MEDICATIONS  (PRN):  acetaminophen   Tablet .. 650 milliGRAM(s) Oral every 6 hours PRN Temp greater or equal to 38C (100.4F), Mild Pain (1 - 3)  acetaminophen   Tablet .. 650 milliGRAM(s) Oral every 6 hours PRN Mild Pain (1 - 3)  dextrose 40% Gel 15 Gram(s) Oral once PRN Blood Glucose LESS THAN 70 milliGRAM(s)/deciliter  glucagon  Injectable 1 milliGRAM(s) IntraMuscular once PRN Glucose LESS THAN 70 milligrams/deciliter  methocarbamol 500 milliGRAM(s) Oral every 6 hours PRN Back Spasms  polyethylene glycol 3350 17 Gram(s) Oral two times a day PRN Constipation      Vital Signs Last 24 Hrs  T(C): 36.6 (08 Oct 2018 07:43), Max: 38.1 (07 Oct 2018 19:47)  T(F): 97.8 (08 Oct 2018 07:43), Max: 100.6 (07 Oct 2018 19:47)  HR: 61 (08 Oct 2018 07:43) (61 - 85)  BP: 132/68 (08 Oct 2018 07:43) (126/70 - 141/65)  BP(mean): --  RR: 18 (08 Oct 2018 07:43) (18 - 18)  SpO2: 93% (08 Oct 2018 07:43) (90% - 96%)  CAPILLARY BLOOD GLUCOSE      POCT Blood Glucose.: 213 mg/dL (08 Oct 2018 08:30)  POCT Blood Glucose.: 125 mg/dL (07 Oct 2018 21:19)  POCT Blood Glucose.: 113 mg/dL (07 Oct 2018 17:17)  POCT Blood Glucose.: 315 mg/dL (07 Oct 2018 12:37)    I&O's Summary    07 Oct 2018 07:01  -  08 Oct 2018 07:00  --------------------------------------------------------  IN: 800 mL / OUT: 868 mL / NET: -68 mL        PHYSICAL EXAM:  GENERAL: NAD  EYES: conjunctiva and sclera clear  NECK: No JVD  CHEST/LUNG: CTA b/l, no crackles appreciated  HEART: S1 S2 RRR  ABDOMEN: +BS Soft, NT/ND  EXTREMITIES:  2+ DP Pulses, No c/c/e  back: drains in place, back incision c/d/i, no erythema  NEUROLOGY: AAOx3, moving all extremities    LABS:                        9.4    7.8   )-----------( 227      ( 08 Oct 2018 06:27 )             28.2     10-08    135  |  104  |  56<H>  ----------------------------<  201<H>  4.3   |  22  |  2.33<H>    Ca    8.2<L>      08 Oct 2018 06:27            Urinalysis Basic - ( 08 Oct 2018 10:34 )    Color: Yellow / Appearance: Clear / S.013 / pH: x  Gluc: x / Ketone: Negative  / Bili: Negative / Urobili: Negative mg/dL   Blood: x / Protein: 30 mg/dL / Nitrite: Negative   Leuk Esterase: Negative / RBC: 2 /HPF / WBC 1 /HPF   Sq Epi: x / Non Sq Epi: 0 /HPF / Bacteria: Negative        RADIOLOGY & ADDITIONAL TESTS:    Imaging Personally Reviewed:  Consultant(s) Notes Reviewed: PT - recc LAYLA   Care Discussed with Consultants/Other Providers: d/w Neurosurgery gurdeep Nelson regarding checking ddimer

## 2018-10-08 NOTE — PROGRESS NOTE ADULT - PROBLEM SELECTOR PLAN 1
UA neg  CXR w/ atelectasis without clear consolidation UA neg  CXR w/ atelectasis without clear consolidation  lungs clear on exam  O2 sat now 93-94% on RA now  would check ddimer. if positive, check vq scan  incentive spirometer  monitor off abx

## 2018-10-08 NOTE — PROGRESS NOTE ADULT - ASSESSMENT
83M with h/o CAD s/p stents (2014), DMT2 ( poorly controlled), Afib on Xarelto ,CKD 4, BECK ( non-compliant with CPAP), hypothyroidism, Lumbar stenosis , Dementia who s/p L4-L5 posterior laminectomy, L3-S1 stabilization and fusion (10/3/18). Hospital course c/b hyperglycemia and now with low grade fevers and hypoxia

## 2018-10-09 ENCOUNTER — TRANSCRIPTION ENCOUNTER (OUTPATIENT)
Age: 83
End: 2018-10-09

## 2018-10-09 VITALS
DIASTOLIC BLOOD PRESSURE: 65 MMHG | OXYGEN SATURATION: 93 % | TEMPERATURE: 99 F | RESPIRATION RATE: 18 BRPM | HEART RATE: 70 BPM | SYSTOLIC BLOOD PRESSURE: 101 MMHG

## 2018-10-09 LAB
GLUCOSE BLDC GLUCOMTR-MCNC: 203 MG/DL — HIGH (ref 70–99)
GLUCOSE BLDC GLUCOMTR-MCNC: 239 MG/DL — HIGH (ref 70–99)

## 2018-10-09 PROCEDURE — 99233 SBSQ HOSP IP/OBS HIGH 50: CPT

## 2018-10-09 RX ORDER — INSULIN LISPRO 100/ML
7 VIAL (ML) SUBCUTANEOUS
Qty: 0 | Refills: 0 | Status: DISCONTINUED | OUTPATIENT
Start: 2018-10-09 | End: 2018-10-09

## 2018-10-09 RX ORDER — SOLIFENACIN SUCCINATE 10 MG/1
1 TABLET ORAL
Qty: 0 | Refills: 0 | COMMUNITY

## 2018-10-09 RX ORDER — LEVOTHYROXINE SODIUM 125 MCG
1 TABLET ORAL
Qty: 0 | Refills: 0 | COMMUNITY
Start: 2018-10-09

## 2018-10-09 RX ORDER — CHOLECALCIFEROL (VITAMIN D3) 125 MCG
1000 CAPSULE ORAL
Qty: 0 | Refills: 0 | COMMUNITY
Start: 2018-10-09

## 2018-10-09 RX ORDER — SODIUM CHLORIDE 9 MG/ML
1000 INJECTION, SOLUTION INTRAVENOUS
Qty: 0 | Refills: 0 | COMMUNITY
Start: 2018-10-09

## 2018-10-09 RX ORDER — AMLODIPINE BESYLATE 2.5 MG/1
1 TABLET ORAL
Qty: 0 | Refills: 0 | COMMUNITY

## 2018-10-09 RX ORDER — ACETAMINOPHEN 500 MG
2 TABLET ORAL
Qty: 0 | Refills: 0 | COMMUNITY
Start: 2018-10-09

## 2018-10-09 RX ORDER — INSULIN GLARGINE 100 [IU]/ML
20 INJECTION, SOLUTION SUBCUTANEOUS EVERY MORNING
Qty: 0 | Refills: 0 | Status: DISCONTINUED | OUTPATIENT
Start: 2018-10-09 | End: 2018-10-09

## 2018-10-09 RX ORDER — ROSUVASTATIN CALCIUM 5 MG/1
1 TABLET ORAL
Qty: 0 | Refills: 0 | COMMUNITY

## 2018-10-09 RX ORDER — INSULIN LISPRO 100/ML
7 VIAL (ML) SUBCUTANEOUS
Qty: 0 | Refills: 0 | COMMUNITY

## 2018-10-09 RX ORDER — TAMSULOSIN HYDROCHLORIDE 0.4 MG/1
1 CAPSULE ORAL
Qty: 0 | Refills: 0 | COMMUNITY
Start: 2018-10-09

## 2018-10-09 RX ORDER — INSULIN GLARGINE 100 [IU]/ML
20 INJECTION, SOLUTION SUBCUTANEOUS
Qty: 0 | Refills: 0 | COMMUNITY

## 2018-10-09 RX ORDER — AMLODIPINE BESYLATE 2.5 MG/1
1 TABLET ORAL
Qty: 0 | Refills: 0 | COMMUNITY
Start: 2018-10-09

## 2018-10-09 RX ORDER — FAMOTIDINE 10 MG/ML
1 INJECTION INTRAVENOUS
Qty: 0 | Refills: 0 | COMMUNITY
Start: 2018-10-09

## 2018-10-09 RX ORDER — MEMANTINE HYDROCHLORIDE 10 MG/1
1 TABLET ORAL
Qty: 0 | Refills: 0 | COMMUNITY

## 2018-10-09 RX ORDER — DOCUSATE SODIUM 100 MG
1 CAPSULE ORAL
Qty: 0 | Refills: 0 | COMMUNITY
Start: 2018-10-09

## 2018-10-09 RX ORDER — OXYBUTYNIN CHLORIDE 5 MG
1 TABLET ORAL
Qty: 0 | Refills: 0 | COMMUNITY
Start: 2018-10-09

## 2018-10-09 RX ORDER — POLYETHYLENE GLYCOL 3350 17 G/17G
17 POWDER, FOR SOLUTION ORAL
Qty: 0 | Refills: 0 | COMMUNITY
Start: 2018-10-09

## 2018-10-09 RX ORDER — FONDAPARINUX SODIUM 2.5 MG/.5ML
1 INJECTION, SOLUTION SUBCUTANEOUS
Qty: 0 | Refills: 0 | COMMUNITY

## 2018-10-09 RX ORDER — LOSARTAN POTASSIUM 100 MG/1
1 TABLET, FILM COATED ORAL
Qty: 0 | Refills: 0 | COMMUNITY
Start: 2018-10-09

## 2018-10-09 RX ORDER — DONEPEZIL HYDROCHLORIDE 10 MG/1
1 TABLET, FILM COATED ORAL
Qty: 0 | Refills: 0 | COMMUNITY

## 2018-10-09 RX ORDER — SENNA PLUS 8.6 MG/1
2 TABLET ORAL
Qty: 0 | Refills: 0 | COMMUNITY
Start: 2018-10-09

## 2018-10-09 RX ORDER — FAMOTIDINE 10 MG/ML
1 INJECTION INTRAVENOUS
Qty: 0 | Refills: 0 | COMMUNITY

## 2018-10-09 RX ORDER — LEVOTHYROXINE SODIUM 125 MCG
1 TABLET ORAL
Qty: 0 | Refills: 0 | COMMUNITY

## 2018-10-09 RX ORDER — METHOCARBAMOL 500 MG/1
1 TABLET, FILM COATED ORAL
Qty: 0 | Refills: 0 | COMMUNITY
Start: 2018-10-09

## 2018-10-09 RX ORDER — SILODOSIN 4 MG/1
1 CAPSULE ORAL
Qty: 0 | Refills: 0 | COMMUNITY

## 2018-10-09 RX ORDER — MEMANTINE HYDROCHLORIDE 10 MG/1
1 TABLET ORAL
Qty: 0 | Refills: 0 | COMMUNITY
Start: 2018-10-09

## 2018-10-09 RX ORDER — DONEPEZIL HYDROCHLORIDE 10 MG/1
1 TABLET, FILM COATED ORAL
Qty: 0 | Refills: 0 | COMMUNITY
Start: 2018-10-09

## 2018-10-09 RX ORDER — CEFUROXIME AXETIL 250 MG
1 TABLET ORAL
Qty: 0 | Refills: 0 | COMMUNITY

## 2018-10-09 RX ORDER — HEPARIN SODIUM 5000 [USP'U]/ML
5000 INJECTION INTRAVENOUS; SUBCUTANEOUS
Qty: 0 | Refills: 0 | COMMUNITY
Start: 2018-10-09

## 2018-10-09 RX ORDER — ASPIRIN/CALCIUM CARB/MAGNESIUM 324 MG
1 TABLET ORAL
Qty: 0 | Refills: 0 | COMMUNITY

## 2018-10-09 RX ORDER — SODIUM BICARBONATE 1 MEQ/ML
1 SYRINGE (ML) INTRAVENOUS
Qty: 0 | Refills: 0 | COMMUNITY
Start: 2018-10-09

## 2018-10-09 RX ORDER — ATORVASTATIN CALCIUM 80 MG/1
1 TABLET, FILM COATED ORAL
Qty: 0 | Refills: 0 | COMMUNITY
Start: 2018-10-09

## 2018-10-09 RX ORDER — ASPIRIN/CALCIUM CARB/MAGNESIUM 324 MG
1 TABLET ORAL
Qty: 0 | Refills: 0 | COMMUNITY
Start: 2018-10-09

## 2018-10-09 RX ORDER — IRBESARTAN 75 MG/1
1 TABLET ORAL
Qty: 0 | Refills: 0 | COMMUNITY

## 2018-10-09 RX ORDER — SODIUM BICARBONATE 1 MEQ/ML
1 SYRINGE (ML) INTRAVENOUS
Qty: 0 | Refills: 0 | COMMUNITY

## 2018-10-09 RX ORDER — GABAPENTIN 400 MG/1
1 CAPSULE ORAL
Qty: 0 | Refills: 0 | COMMUNITY
Start: 2018-10-09

## 2018-10-09 RX ORDER — REPAGLINIDE 1 MG/1
1 TABLET ORAL
Qty: 0 | Refills: 0 | COMMUNITY

## 2018-10-09 RX ADMIN — Medication 25 MILLIGRAM(S): at 12:19

## 2018-10-09 RX ADMIN — Medication 7 UNIT(S): at 13:02

## 2018-10-09 RX ADMIN — Medication 81 MILLIGRAM(S): at 12:21

## 2018-10-09 RX ADMIN — Medication 2: at 13:01

## 2018-10-09 RX ADMIN — Medication 325 MILLIGRAM(S): at 12:17

## 2018-10-09 RX ADMIN — Medication 1000 UNIT(S): at 12:19

## 2018-10-09 RX ADMIN — Medication 100 MICROGRAM(S): at 05:58

## 2018-10-09 RX ADMIN — Medication 75 MILLIGRAM(S): at 12:20

## 2018-10-09 RX ADMIN — FAMOTIDINE 20 MILLIGRAM(S): 10 INJECTION INTRAVENOUS at 12:18

## 2018-10-09 RX ADMIN — Medication 1 TABLET(S): at 12:20

## 2018-10-09 RX ADMIN — INSULIN GLARGINE 18 UNIT(S): 100 INJECTION, SOLUTION SUBCUTANEOUS at 08:47

## 2018-10-09 RX ADMIN — LOSARTAN POTASSIUM 50 MILLIGRAM(S): 100 TABLET, FILM COATED ORAL at 05:57

## 2018-10-09 RX ADMIN — Medication 5 UNIT(S): at 08:45

## 2018-10-09 RX ADMIN — MEMANTINE HYDROCHLORIDE 10 MILLIGRAM(S): 10 TABLET ORAL at 05:59

## 2018-10-09 RX ADMIN — AMLODIPINE BESYLATE 5 MILLIGRAM(S): 2.5 TABLET ORAL at 05:58

## 2018-10-09 RX ADMIN — HEPARIN SODIUM 5000 UNIT(S): 5000 INJECTION INTRAVENOUS; SUBCUTANEOUS at 05:57

## 2018-10-09 RX ADMIN — GABAPENTIN 300 MILLIGRAM(S): 400 CAPSULE ORAL at 05:58

## 2018-10-09 RX ADMIN — Medication 100 MILLIGRAM(S): at 13:03

## 2018-10-09 RX ADMIN — Medication 2: at 08:45

## 2018-10-09 RX ADMIN — Medication 5 MILLIGRAM(S): at 12:17

## 2018-10-09 RX ADMIN — GABAPENTIN 300 MILLIGRAM(S): 400 CAPSULE ORAL at 13:03

## 2018-10-09 RX ADMIN — Medication 650 MILLIGRAM(S): at 13:04

## 2018-10-09 RX ADMIN — Medication 100 MILLIGRAM(S): at 05:58

## 2018-10-09 RX ADMIN — Medication 650 MILLIGRAM(S): at 05:58

## 2018-10-09 RX ADMIN — Medication 12.5 MILLIGRAM(S): at 05:58

## 2018-10-09 NOTE — DISCHARGE NOTE ADULT - MEDICATION SUMMARY - MEDICATIONS TO STOP TAKING
I will STOP taking the medications listed below when I get home from the hospital:    repaglinide 2 mg oral tablet  -- 1 tab(s) by mouth 2 times a day    Crestor 20 mg oral tablet  -- 1 tab(s) by mouth once a day (at bedtime)    Rapaflo 8 mg oral capsule  -- 1 cap(s) by mouth once a day    VESIcare 5 mg oral tablet  -- 1 tab(s) by mouth once a day    Xarelto 15 mg oral tablet  -- 1 tab(s) by mouth once a day (in the evening)    irbesartan 75 mg oral tablet  -- 1 tab(s) by mouth once a day    cefuroxime 500 mg oral tablet  -- 1 tab(s) by mouth every 12 hours

## 2018-10-09 NOTE — DISCHARGE NOTE ADULT - MEDICATION SUMMARY - MEDICATIONS TO TAKE
I will START or STAY ON the medications listed below when I get home from the hospital:    vitamin d  -- 1000 international unit(s) by mouth once a day  -- Indication: For vitamin d deficiency    acetaminophen 325 mg oral tablet  -- 2 tab(s) by mouth every 6 hours, As needed, Temp greater or equal to 38C (100.4F), Mild Pain (1 - 3)  -- Indication: For For fever    acetaminophen 325 mg oral tablet  -- 2 tab(s) by mouth every 6 hours, As needed, Mild Pain (1 - 3)  -- Indication: For For pain    aspirin 81 mg oral delayed release tablet  -- 1 tab(s) by mouth once a day  -- Indication: For Afib    losartan 50 mg oral tablet  -- 1 tab(s) by mouth once a day  -- Indication: For Hypertension    sodium bicarbonate 650 mg oral tablet  -- 1 tab(s) by mouth 2 times a day  -- Indication: For Chronic kidney disease    tamsulosin 0.4 mg oral capsule  -- 1 cap(s) by mouth once a day (at bedtime)  -- Indication: For bph    heparin  -- 5000 unit(s) subcutaneous every 8 hours  -- Indication: For Dvt prophylaxis    gabapentin 300 mg oral capsule  -- 1 cap(s) by mouth 3 times a day  -- Indication: For Neuropathy    venlafaxine 75 mg oral capsule, extended release  -- 1 cap(s) by mouth once a day  -- Indication: For Anxiety    traZODone  -- 25 milligram(s) by mouth once a day  -- Indication: For insomnia    HumaLOG 100 units/mL injectable solution  -- 7 unit(s) injectable 3 times a day (before meals)  -- Indication: For Diabetes    Lantus 100 units/mL subcutaneous solution  -- 20 unit(s) subcutaneous once a day  -- Indication: For Diabetes    atorvastatin 80 mg oral tablet  -- 1 tab(s) by mouth once a day (at bedtime)  -- Indication: For Hyperlipidemia    metoprolol  -- 12.5 milligram(s) by mouth 2 times a day  -- Indication: For Hafib    amLODIPine 5 mg oral tablet  -- 1 tab(s) by mouth once a day  -- Indication: For Hypertension    donepezil 10 mg oral tablet  -- 1 tab(s) by mouth once a day (at bedtime)  -- Indication: For Hypertension    famotidine 20 mg oral tablet  -- 1 tab(s) by mouth once a day  -- Indication: For gerd    Dextrose 5% in Water intravenous solution  -- 1000 milliliter(s) intravenous   -- Indication: For Diabetes    ferrous sulfate  -- 65 milligram(s) by mouth once a day  -- Indication: For Anemia    docusate sodium 100 mg oral capsule  -- 1 cap(s) by mouth 3 times a day  -- Indication: For Stool softner    polyethylene glycol 3350 oral powder for reconstitution  -- 17 gram(s) by mouth 2 times a day, As needed, Constipation  -- Indication: For Constipation    senna oral tablet  -- 2 tab(s) by mouth once a day (at bedtime)  -- Indication: For Constipation    memantine 10 mg oral tablet  -- 1 tab(s) by mouth 2 times a day  -- Indication: For Dementia    methocarbamol 500 mg oral tablet  -- 1 tab(s) by mouth every 6 hours, As needed, Back Spasms  -- Indication: For muscle spasm    levothyroxine 100 mcg (0.1 mg) oral tablet  -- 1 tab(s) by mouth once a day  -- Indication: For Hypothyroidism (acquired)    oxybutynin 5 mg oral tablet  -- 1 tab(s) by mouth 2 times a day  -- Indication: For Urgency    Multiple Vitamins oral tablet  -- 1 tab(s) by mouth once a day  -- Indication: For vitamin    cholecalciferol oral tablet  -- 1000 unit(s) by mouth once a day  -- Indication: For vitamin

## 2018-10-09 NOTE — PROGRESS NOTE ADULT - PROVIDER SPECIALTY LIST ADULT
Hospitalist
Hospitalist
NSICU
NSICU
Neurosurgery
NSICU
Hospitalist

## 2018-10-09 NOTE — DISCHARGE NOTE ADULT - CARE PROVIDER_API CALL
Jolie Ramos), Pediatrics  [address] [address]  Phone: [phone]  Fax: [phone] Reji Ramos (MD), Neurological Surgery  [address] [place] [address]  Phone: [phone]  Fax: [phone]

## 2018-10-09 NOTE — PROGRESS NOTE ADULT - SUBJECTIVE AND OBJECTIVE BOX
SUBJECTIVE: patient denies pain overnight.  No chest pain or shortness of breathe.  Patient's sugars are elevated.      Vital Signs Last 24 Hrs  T(C): 37 (09 Oct 2018 07:37), Max: 37.3 (08 Oct 2018 12:46)  T(F): 98.6 (09 Oct 2018 07:37), Max: 99.1 (08 Oct 2018 12:46)  HR: 70 (09 Oct 2018 07:37) (64 - 80)  BP: 101/65 (09 Oct 2018 07:37) (101/65 - 154/67)  BP(mean): --  RR: 18 (09 Oct 2018 07:37) (18 - 18)  SpO2: 93% (09 Oct 2018 07:37) (93% - 95%)    PHYSICAL EXAM:    Constitutional: No Acute Distress     Neurological: AOx3, Following Commands, Moving all Extremities     Motor exam:          Upper extremity                         Delt     Bicep     Tricep    HG                                                 R         5/5        5/5        5/5       5/5                                               L          5/5        5/5        5/5       5/5          Lower extremity                        HF         KF        KE       DF         PF                                                  R        5/5        5/5        5/5       5/5         5/5                                               L         5/5        5/5       5/5       5/5          5/5                                                 Sensation: [x] intact to light touch  [] decreased:     Pulmonary: Clear to Auscultation, No rales, No rhonchi, No wheezes     Cardiovascular: S1, S2, Regular rate and rhythm     Gastrointestinal: Soft, Non-tender, Non-distended     Extremities: No calf tenderness     Incision: c/d/i  LABS:                        9.4    7.8   )-----------( 227      ( 08 Oct 2018 06:27 )             28.2    10-08    135  |  104  |  56<H>  ----------------------------<  201<H>  4.3   |  22  |  2.33<H>    Ca    8.2<L>      08 Oct 2018 06:27    Hemoglobin A1C, Whole Blood: 6.1 % (09-20-18 @ 21:00)      10-08 @ 07:01  -  10-09 @ 07:00  --------------------------------------------------------  IN: 800 mL / OUT: 500 mL / NET: 300 mL      DRAINS:     MEDICATIONS:  Anticoagulation:   aspirin enteric coated 81 milliGRAM(s) Oral daily  heparin  Injectable 5000 Unit(s) SubCutaneous every 12 hours    Antibiotics:    Endo:  atorvastatin 80 milliGRAM(s) Oral at bedtime  dextrose 40% Gel 15 Gram(s) Oral once PRN  dextrose 50% Injectable 12.5 Gram(s) IV Push once  dextrose 50% Injectable 25 Gram(s) IV Push once  dextrose 50% Injectable 25 Gram(s) IV Push once  glucagon  Injectable 1 milliGRAM(s) IntraMuscular once PRN  insulin glargine Injectable (LANTUS) 20 Unit(s) SubCutaneous every morning  insulin lispro (HumaLOG) corrective regimen sliding scale   SubCutaneous three times a day before meals  insulin lispro Injectable (HumaLOG) 7 Unit(s) SubCutaneous three times a day before meals  levothyroxine 100 MICROGram(s) Oral daily    Neuro:  acetaminophen   Tablet .. 650 milliGRAM(s) Oral every 6 hours PRN Temp greater or equal to 38C (100.4F), Mild Pain (1 - 3)  acetaminophen   Tablet .. 650 milliGRAM(s) Oral every 6 hours PRN Mild Pain (1 - 3)  donepezil 10 milliGRAM(s) Oral at bedtime  gabapentin 300 milliGRAM(s) Oral three times a day  memantine 10 milliGRAM(s) Oral two times a day  methocarbamol 500 milliGRAM(s) Oral every 6 hours PRN Back Spasms  traZODone 25 milliGRAM(s) Oral daily  venlafaxine XR. 75 milliGRAM(s) Oral daily    Cardiac:  amLODIPine   Tablet 5 milliGRAM(s) Oral daily  losartan 50 milliGRAM(s) Oral daily  metoprolol tartrate 12.5 milliGRAM(s) Oral two times a day  tamsulosin 0.4 milliGRAM(s) Oral at bedtime    Pulm:    GI/:  docusate sodium 100 milliGRAM(s) Oral three times a day  famotidine    Tablet 20 milliGRAM(s) Oral daily  oxybutynin 5 milliGRAM(s) Oral two times a day  polyethylene glycol 3350 17 Gram(s) Oral two times a day PRN Constipation  senna 2 Tablet(s) Oral at bedtime    Other:   cholecalciferol 1000 Unit(s) Oral daily  dextrose 5%. 1000 milliLiter(s) IV Continuous <Continuous>  ferrous    sulfate Liquid 325 milliGRAM(s) Oral daily  influenza   Vaccine 0.5 milliLiter(s) IntraMuscular once  multivitamin 1 Tablet(s) Oral daily  sodium bicarbonate 650 milliGRAM(s) Oral two times a day    DIET: ccd    IMAGING:

## 2018-10-09 NOTE — PROGRESS NOTE ADULT - PROBLEM SELECTOR PLAN 2
Improved off Decadron. fs may remain in adequate range of Decadron.   FS elevated  agree w/ increase lantus to 20 units qhs and premeal humalog to 7units qac, lower coverage to low dose sliding scale  monitor FS
Improved off Decadron. fs may remain in adequate range of Decadron.   FS elevated  would increase lantus to 18 units qhs, c/w premeal humalog 5units qac, lower coverage to low dose sliding scale  monitor FS
cont current tx. AC once safe from neurosurg standpoint

## 2018-10-09 NOTE — DISCHARGE NOTE ADULT - SECONDARY DIAGNOSIS.
ASHD (Arteriosclerotic Heart Disease) Atrial fibrillation CKD (chronic kidney disease) stage 4, GFR 15-29 ml/min Constipation Dementia Diabetes

## 2018-10-09 NOTE — PROGRESS NOTE ADULT - ATTENDING COMMENTS
Pt doing well.  Continue Hemovacs.  ASA 81 mg QD  PT consult - increase activity    Dr. Dashawn Chatterjee to cover for me until 10/8/2018.
Pt doing well.  Continue Hemovacs.  On ASA 81 mg.  PT consult.
Dr. [facility]lerAs[facility]ist  [phone]
Dr. JO[facility]ler[facility]ima[facility]  [phone]

## 2018-10-09 NOTE — DISCHARGE NOTE ADULT - HOSPITAL COURSE
83 year old male with a past medical history of chronic kidney disease, obstructive sleep apnea, non-compliant with CPAP, CAD, S/P coronary artery stents 2014, abnormal stress test, S/P diagnostic angiogram 8/29/18 without intervention, continuing on current medication regimen, A fib on xarelto, lumbar stenosis, c/o low keiry pain for the past 10 years getting progressively worse.  Patient admitted october 3rd 2018.  Patient had a removal of spinal hardware, l4-5 laminectomy and l3-s1 instrumentation and fusion with iliac crest bone graft.  on 10/8/18 patient had his drains removed.  On 10/7 patient had low grade fever 38.1 and cultured ntd, follow up final cultures.  Patient was hyperglycemic and lantus and humalog were adjusted.    Patient was seen by physical therapy and recommended for LAYLA.  On day of discharge patient was medically and neurologically stable for discharge.

## 2018-10-09 NOTE — DISCHARGE NOTE ADULT - ADDITIONAL INSTRUCTIONS
let attending know if any change in mental status, difficulty walking, nausea vomiting, drainage from incision, pain not controlled by medication  keep incision dry and clean  follow up with primary care physician 1-2 weeks after discharge from rehab  follow up with Dr. Ramos in 1-2 weeks after discharge from rehab

## 2018-10-09 NOTE — DISCHARGE NOTE ADULT - CARE PROVIDERS DIRECT ADDRESSES
,DirectAddress_Unknown ,[email or web address]

## 2018-10-09 NOTE — PROGRESS NOTE ADULT - ASSESSMENT
83M with h/o CAD s/p stents (2014), DMT2 ( poorly controlled), Afib on Xarelto ,CKD 4, BECK ( non-compliant with CPAP), hypothyroidism, Lumbar stenosis , Dementia who s/p L4-L5 posterior laminectomy, L3-S1 stabilization and fusion (10/3/18). Hospital course c/b hyperglycemia and now with low grade fevers and mild hypoxia

## 2018-10-09 NOTE — DISCHARGE NOTE ADULT - CARE PLAN
Principal Discharge DX:	Lumbar stenosis  Goal:	10/3 removal of spinal hardware l4-5 lamiectomy and l3-s1 instrumentation and fusion iliac crest bone graft  Assessment and plan of treatment:	follow up with Dr. Ramos in 1 week after discharge [phone]  Secondary Diagnosis:	ASHD (Arteriosclerotic Heart Disease)  Goal:	continue lipitor  Secondary Diagnosis:	Atrial fibrillation  Goal:	continue aspirin and lopressor  Secondary Diagnosis:	CKD (chronic kidney disease) stage 4, GFR 15-29 ml/min  Goal:	baseline creatine 2.33 follow up with nephrologist  Secondary Diagnosis:	Constipation  Goal:	continue stool softners  Secondary Diagnosis:	Dementia  Goal:	continue home medication  Secondary Diagnosis:	Diabetes  Goal:	follow finger sticks continue lantus and humalog.  follow up with primary care

## 2018-10-09 NOTE — PROGRESS NOTE ADULT - SUBJECTIVE AND OBJECTIVE BOX
Patient is a 83y old  Male who presents with a chief complaint of I'm having low back surgery. (09 Oct 2018 10:05)        SUBJECTIVE / OVERNIGHT EVENTS:      MEDICATIONS  (STANDING):  amLODIPine   Tablet 5 milliGRAM(s) Oral daily  aspirin enteric coated 81 milliGRAM(s) Oral daily  atorvastatin 80 milliGRAM(s) Oral at bedtime  cholecalciferol 1000 Unit(s) Oral daily  dextrose 5%. 1000 milliLiter(s) (50 mL/Hr) IV Continuous <Continuous>  dextrose 50% Injectable 12.5 Gram(s) IV Push once  dextrose 50% Injectable 25 Gram(s) IV Push once  dextrose 50% Injectable 25 Gram(s) IV Push once  docusate sodium 100 milliGRAM(s) Oral three times a day  donepezil 10 milliGRAM(s) Oral at bedtime  famotidine    Tablet 20 milliGRAM(s) Oral daily  ferrous    sulfate Liquid 325 milliGRAM(s) Oral daily  gabapentin 300 milliGRAM(s) Oral three times a day  heparin  Injectable 5000 Unit(s) SubCutaneous every 12 hours  influenza   Vaccine 0.5 milliLiter(s) IntraMuscular once  insulin glargine Injectable (LANTUS) 20 Unit(s) SubCutaneous every morning  insulin lispro (HumaLOG) corrective regimen sliding scale   SubCutaneous three times a day before meals  insulin lispro Injectable (HumaLOG) 7 Unit(s) SubCutaneous three times a day before meals  levothyroxine 100 MICROGram(s) Oral daily  losartan 50 milliGRAM(s) Oral daily  memantine 10 milliGRAM(s) Oral two times a day  metoprolol tartrate 12.5 milliGRAM(s) Oral two times a day  multivitamin 1 Tablet(s) Oral daily  oxybutynin 5 milliGRAM(s) Oral two times a day  senna 2 Tablet(s) Oral at bedtime  sodium bicarbonate 650 milliGRAM(s) Oral two times a day  tamsulosin 0.4 milliGRAM(s) Oral at bedtime  traZODone 25 milliGRAM(s) Oral daily  venlafaxine XR. 75 milliGRAM(s) Oral daily    MEDICATIONS  (PRN):  acetaminophen   Tablet .. 650 milliGRAM(s) Oral every 6 hours PRN Temp greater or equal to 38C (100.4F), Mild Pain (1 - 3)  acetaminophen   Tablet .. 650 milliGRAM(s) Oral every 6 hours PRN Mild Pain (1 - 3)  dextrose 40% Gel 15 Gram(s) Oral once PRN Blood Glucose LESS THAN 70 milliGRAM(s)/deciliter  glucagon  Injectable 1 milliGRAM(s) IntraMuscular once PRN Glucose LESS THAN 70 milligrams/deciliter  methocarbamol 500 milliGRAM(s) Oral every 6 hours PRN Back Spasms  polyethylene glycol 3350 17 Gram(s) Oral two times a day PRN Constipation      Vital Signs Last 24 Hrs  T(C): 37 (09 Oct 2018 07:37), Max: 37.3 (08 Oct 2018 12:46)  T(F): 98.6 (09 Oct 2018 07:37), Max: 99.1 (08 Oct 2018 12:46)  HR: 70 (09 Oct 2018 07:37) (64 - 80)  BP: 101/65 (09 Oct 2018 07:37) (101/65 - 154/67)  BP(mean): --  RR: 18 (09 Oct 2018 07:37) (18 - 18)  SpO2: 93% (09 Oct 2018 07:37) (93% - 95%)  CAPILLARY BLOOD GLUCOSE      POCT Blood Glucose.: 239 mg/dL (09 Oct 2018 08:18)  POCT Blood Glucose.: 186 mg/dL (08 Oct 2018 21:35)  POCT Blood Glucose.: 203 mg/dL (08 Oct 2018 17:10)  POCT Blood Glucose.: 149 mg/dL (08 Oct 2018 13:25)  POCT Blood Glucose.: 153 mg/dL (08 Oct 2018 12:16)    I&O's Summary    08 Oct 2018 07:01  -  09 Oct 2018 07:00  --------------------------------------------------------  IN: 800 mL / OUT: 500 mL / NET: 300 mL        PHYSICAL EXAM:  GENERAL: NAD  EYES: conjunctiva and sclera clear  NECK: No JVD  CHEST/LUNG: CTA b/l, no crackles appreciated  HEART: S1 S2 RRR  ABDOMEN: +BS Soft, NT/ND  EXTREMITIES:  2+ DP Pulses, No c/c/e  back: drains in place, back incision c/d/i, no erythema  NEUROLOGY: AAOx3, moving all extremities    LABS:                        9.4    7.8   )-----------( 227      ( 08 Oct 2018 06:27 )             28.2     10-08    135  |  104  |  56<H>  ----------------------------<  201<H>  4.3   |  22  |  2.33<H>    Ca    8.2<L>      08 Oct 2018 06:27            Urinalysis Basic - ( 08 Oct 2018 10:34 )    Color: Yellow / Appearance: Clear / S.013 / pH: x  Gluc: x / Ketone: Negative  / Bili: Negative / Urobili: Negative mg/dL   Blood: x / Protein: 30 mg/dL / Nitrite: Negative   Leuk Esterase: Negative / RBC: 2 /HPF / WBC 1 /HPF   Sq Epi: x / Non Sq Epi: 0 /HPF / Bacteria: Negative        RADIOLOGY & ADDITIONAL TESTS:    Imaging Personally Reviewed: CXR film reviewed - atelectasis, no consolidation  Consultant(s) Notes Reviewed: case management   Care Discussed with Consultants/Other Providers: d/w Neurosurgery NP - Jyoti regarding plan of care Patient is a 83y old  Male who presents with a chief complaint of I'm having low back surgery. (09 Oct 2018 10:05)        SUBJECTIVE / OVERNIGHT EVENTS: no acute complaints      MEDICATIONS  (STANDING):  amLODIPine   Tablet 5 milliGRAM(s) Oral daily  aspirin enteric coated 81 milliGRAM(s) Oral daily  atorvastatin 80 milliGRAM(s) Oral at bedtime  cholecalciferol 1000 Unit(s) Oral daily  dextrose 5%. 1000 milliLiter(s) (50 mL/Hr) IV Continuous <Continuous>  dextrose 50% Injectable 12.5 Gram(s) IV Push once  dextrose 50% Injectable 25 Gram(s) IV Push once  dextrose 50% Injectable 25 Gram(s) IV Push once  docusate sodium 100 milliGRAM(s) Oral three times a day  donepezil 10 milliGRAM(s) Oral at bedtime  famotidine    Tablet 20 milliGRAM(s) Oral daily  ferrous    sulfate Liquid 325 milliGRAM(s) Oral daily  gabapentin 300 milliGRAM(s) Oral three times a day  heparin  Injectable 5000 Unit(s) SubCutaneous every 12 hours  influenza   Vaccine 0.5 milliLiter(s) IntraMuscular once  insulin glargine Injectable (LANTUS) 20 Unit(s) SubCutaneous every morning  insulin lispro (HumaLOG) corrective regimen sliding scale   SubCutaneous three times a day before meals  insulin lispro Injectable (HumaLOG) 7 Unit(s) SubCutaneous three times a day before meals  levothyroxine 100 MICROGram(s) Oral daily  losartan 50 milliGRAM(s) Oral daily  memantine 10 milliGRAM(s) Oral two times a day  metoprolol tartrate 12.5 milliGRAM(s) Oral two times a day  multivitamin 1 Tablet(s) Oral daily  oxybutynin 5 milliGRAM(s) Oral two times a day  senna 2 Tablet(s) Oral at bedtime  sodium bicarbonate 650 milliGRAM(s) Oral two times a day  tamsulosin 0.4 milliGRAM(s) Oral at bedtime  traZODone 25 milliGRAM(s) Oral daily  venlafaxine XR. 75 milliGRAM(s) Oral daily    MEDICATIONS  (PRN):  acetaminophen   Tablet .. 650 milliGRAM(s) Oral every 6 hours PRN Temp greater or equal to 38C (100.4F), Mild Pain (1 - 3)  acetaminophen   Tablet .. 650 milliGRAM(s) Oral every 6 hours PRN Mild Pain (1 - 3)  dextrose 40% Gel 15 Gram(s) Oral once PRN Blood Glucose LESS THAN 70 milliGRAM(s)/deciliter  glucagon  Injectable 1 milliGRAM(s) IntraMuscular once PRN Glucose LESS THAN 70 milligrams/deciliter  methocarbamol 500 milliGRAM(s) Oral every 6 hours PRN Back Spasms  polyethylene glycol 3350 17 Gram(s) Oral two times a day PRN Constipation      Vital Signs Last 24 Hrs  T(C): 37 (09 Oct 2018 07:37), Max: 37.3 (08 Oct 2018 12:46)  T(F): 98.6 (09 Oct 2018 07:37), Max: 99.1 (08 Oct 2018 12:46)  HR: 70 (09 Oct 2018 07:37) (64 - 80)  BP: 101/65 (09 Oct 2018 07:37) (101/65 - 154/67)  BP(mean): --  RR: 18 (09 Oct 2018 07:37) (18 - 18)  SpO2: 93% (09 Oct 2018 07:37) (93% - 95%)  CAPILLARY BLOOD GLUCOSE      POCT Blood Glucose.: 239 mg/dL (09 Oct 2018 08:18)  POCT Blood Glucose.: 186 mg/dL (08 Oct 2018 21:35)  POCT Blood Glucose.: 203 mg/dL (08 Oct 2018 17:10)  POCT Blood Glucose.: 149 mg/dL (08 Oct 2018 13:25)  POCT Blood Glucose.: 153 mg/dL (08 Oct 2018 12:16)    I&O's Summary    08 Oct 2018 07:01  -  09 Oct 2018 07:00  --------------------------------------------------------  IN: 800 mL / OUT: 500 mL / NET: 300 mL        PHYSICAL EXAM:  GENERAL: NAD  EYES: conjunctiva and sclera clear  NECK: No JVD  CHEST/LUNG: limited exam secondary to effort overall -CTA b/l, no crackles appreciated  HEART: S1 S2 RRR  ABDOMEN: +BS Soft, NT/ND  EXTREMITIES:  2+ DP Pulses, No c/c/e  back: drains in place, back incision c/d/i, no erythema  NEUROLOGY: AAOx3, moving all extremities    LABS:                        9.4    7.8   )-----------( 227      ( 08 Oct 2018 06:27 )             28.2     10-08    135  |  104  |  56<H>  ----------------------------<  201<H>  4.3   |  22  |  2.33<H>    Ca    8.2<L>      08 Oct 2018 06:27            Urinalysis Basic - ( 08 Oct 2018 10:34 )    Color: Yellow / Appearance: Clear / S.013 / pH: x  Gluc: x / Ketone: Negative  / Bili: Negative / Urobili: Negative mg/dL   Blood: x / Protein: 30 mg/dL / Nitrite: Negative   Leuk Esterase: Negative / RBC: 2 /HPF / WBC 1 /HPF   Sq Epi: x / Non Sq Epi: 0 /HPF / Bacteria: Negative        RADIOLOGY & ADDITIONAL TESTS:    Imaging Personally Reviewed: CXR film reviewed - atelectasis, no consolidation  Consultant(s) Notes Reviewed: case management   Care Discussed with Consultants/Other Providers: d/w Neurosurgery NP - Jyoti regarding plan of care

## 2018-10-09 NOTE — DISCHARGE NOTE ADULT - PLAN OF CARE
10/3 removal of spinal hardware l4-5 lamiectomy and l3-s1 instrumentation and fusion iliac crest bone graft follow up with Dr. Ramos in 1 week after discharge [phone] continue lipitor continue aspirin and lopressor baseline creatine 2.33 follow up with nephrologist continue stool softners continue home medication follow finger sticks continue lantus and humalog.  follow up with primary care

## 2018-10-09 NOTE — PROGRESS NOTE ADULT - ASSESSMENT
HPI:  84 y/o M PMH CKD, BECK, non-compliant with CPAP, CAD, S/P coronary artery stents 2014, abnormal stress test, S/P diagnostic angiogram 8/29/18 without intervention, continuing on current medication regimen, A fib on xarelto, lumbar stenosis, c/o low keiry pain for the past 10 years getting progressively worse.  Presents today for L4-L5 posterior laminectomy, L3-S1 stabilization and fusion. (20 Sep 2018 17:03)    PROCEDURE: Removal of hardware  Lumbar laminectomy  Fusion of lumbar spine at 3 or more levels using posterior or posterolateral technique with instrumentation and bone graft     10/3/18 removal of spinal hardwar with l4-5 lamiectomy and l3-s1 instrumentation and fusion iliac crest bone graft   PAST MEDICAL & SURGICAL HISTORY:  Dementia  CKD (chronic kidney disease)  Mitral regurgitation  BECK on CPAP  Prostate cancer  History of Hypothyroidism  GERD (Gastroesophageal Reflux Disease)  Osteoarthritis  BPH (Benign Prostatic Hypertrophy)  ASHD (Arteriosclerotic Heart Disease)  Diabetes Mellitus Type II  History of Spinal Stenosis  Hypercholesterolemia  S/P tonsillectomy  Central Spinal Stenosis: x stop procedure done  S/P Cataract Surgery: b/l eyes        PLAN:  Neuro: neuro checks q 4, vitals q 4, continue with tylenol for pain control, out of bed, effexor for anxiety neurontin for pain and neuropathy  Respiratory:  incentive spirometry   CV: history of afib continue on aspirin for now, hyperlipidemia continue lipitor  Endocrine:  history of diabeties with hyperglycemia increase lantus to 20 from 18 and humalog to 7 from 5 and continue to monitor, hypothyroidism continue synthyroid  Heme/Onc:     stable anemia from chronic kidney disease      DVT ppx: sqh and scds   Renal: ckd stage 3 avoid NSAIDS, flomax for bph   ID: afebrile  GI:  ccd diet  PT/OT: LAYLA  dc home today   Will discuss with Dr. Ramos    [phone]

## 2018-10-09 NOTE — PROGRESS NOTE ADULT - REASON FOR ADMISSION
Admitted 10/3 s/p removal of spinal hardware, L4-5 laminectomy, L3-S1 instrumentation and fusion with iliac crest bone graft
I'm having low back surgery.
low back pain
back pain
back pain

## 2018-10-09 NOTE — DISCHARGE NOTE ADULT - PATIENT PORTAL LINK FT
You can access the [facility] Portal, offered by [facility], by registering with the following website: [email or web address]

## 2018-10-09 NOTE — PROGRESS NOTE ADULT - PROBLEM SELECTOR PROBLEM 2
Uncontrolled type 2 diabetes mellitus with hyperglycemia
Atrial fibrillation, unspecified type
Uncontrolled type 2 diabetes mellitus with hyperglycemia

## 2018-10-09 NOTE — PROGRESS NOTE ADULT - PROBLEM SELECTOR PLAN 1
UA neg  CXR w/ atelectasis without clear consolidation  lungs clear on exam  O2 sat now 93-94% on RA now  ddimer elevated. hypoxia resolved and no further fevers. d/w neurosurgery, feels elevated ddimer elevated due to surgery and vq scan not ordered. patient has no sob or chest pain and O2 sat improved with incentive spirometer. if recurrent hypoxia, chest pain, or sob, would check vq scan.   incentive spirometer  continue monitor off abx

## 2018-10-13 LAB
CULTURE RESULTS: SIGNIFICANT CHANGE UP
CULTURE RESULTS: SIGNIFICANT CHANGE UP
SPECIMEN SOURCE: SIGNIFICANT CHANGE UP
SPECIMEN SOURCE: SIGNIFICANT CHANGE UP

## 2018-10-17 LAB — SURGICAL PATHOLOGY STUDY: SIGNIFICANT CHANGE UP

## 2018-11-11 PROCEDURE — 86891 AUTOLOGOUS BLOOD OP SALVAGE: CPT

## 2018-11-11 PROCEDURE — 97116 GAIT TRAINING THERAPY: CPT

## 2018-11-11 PROCEDURE — 81001 URINALYSIS AUTO W/SCOPE: CPT

## 2018-11-11 PROCEDURE — 97110 THERAPEUTIC EXERCISES: CPT

## 2018-11-11 PROCEDURE — 80048 BASIC METABOLIC PNL TOTAL CA: CPT

## 2018-11-11 PROCEDURE — C1769: CPT

## 2018-11-11 PROCEDURE — 76000 FLUOROSCOPY <1 HR PHYS/QHP: CPT

## 2018-11-11 PROCEDURE — P9016: CPT

## 2018-11-11 PROCEDURE — 85379 FIBRIN DEGRADATION QUANT: CPT

## 2018-11-11 PROCEDURE — 85027 COMPLETE CBC AUTOMATED: CPT

## 2018-11-11 PROCEDURE — 97162 PT EVAL MOD COMPLEX 30 MIN: CPT

## 2018-11-11 PROCEDURE — C1713: CPT

## 2018-11-11 PROCEDURE — 88300 SURGICAL PATH GROSS: CPT

## 2018-11-11 PROCEDURE — 82962 GLUCOSE BLOOD TEST: CPT

## 2018-11-11 PROCEDURE — 86923 COMPATIBILITY TEST ELECTRIC: CPT

## 2018-11-11 PROCEDURE — 87040 BLOOD CULTURE FOR BACTERIA: CPT

## 2018-11-11 PROCEDURE — 36430 TRANSFUSION BLD/BLD COMPNT: CPT

## 2018-11-11 PROCEDURE — 71045 X-RAY EXAM CHEST 1 VIEW: CPT

## 2018-11-11 PROCEDURE — C1889: CPT

## 2018-11-14 ENCOUNTER — APPOINTMENT (OUTPATIENT)
Dept: ELECTROPHYSIOLOGY | Facility: CLINIC | Age: 83
End: 2018-11-14

## 2018-12-01 NOTE — PRE-OP CHECKLIST - INTERNAL PROSTHESES
Reason For Visit  HAL SMYTH is a new patient here today for an annual physical.   :  services not used.   A chaperone is not applicable. He is unaccompanied.        Quality    Adult Wellness CI height documented, discussion of regular exercise, exercising regularly, not using alcohol, colonoscopy performed: 2011 , no tobacco use, does not have feelings of hopelessness (PHQ-2), no Anhedonia (PHQ-2), not referred to local mental health center, not taking medication for depression, no preventive medicine therapy for influenza, preventive medicine therapy for pneumococcal, has not fallen within the last 12 months, able to walk and taking aspirin.      History of Present Illness  here to establish care - retired  of [employer]     does exercise outdoors; maintains heart rate 110 or below as recommended by Dr. Negrete    hyperglycemia - glucose has been high, \"prediabetic\"; following diet and exercise     non-sustained ventricular tachycardia - developed a few years ago; treated by Dr. Negrete but now switched to Dr. Hal Rosario after Dr. 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      Review of Systems    Const: Normal.   Allergy & Immunology: Normal.   Eyes: Normal.   ENT: Normal.   CV:. Per HPI.   Resp: Normal.   Breast: Normal.   GI: Normal.   : Normal.   Endo:. Per HPI.   Heme/Lymph: Normal.   Musc:. Per HPI.   Neuro: Normal.   Psych: Normal.   Skin: Normal.       Allergies  No Known Drug Allergies    Current Meds   1. Aspirin 81 MG TABS;   Therapy: 05Mar2014 to Recorded   2. Atorvastatin Calcium 20 MG Oral Tablet;   Therapy: (Recorded:[id number]) to Recorded   3. Flomax 0.4 MG Oral Capsule; TAKE 1 CAPSULE DAILY;   Therapy: (Recorded:05Jan2012) to Recorded   4.  Metoprolol Succinate ER 25 MG Oral Tablet Extended Release 24 Hour;   Therapy: (Recorded:[id number]) to Recorded    Active Problems  Bronchitis (J40)  Elevated coronary artery calcium score (R93.1)  Mixed hyperlipidemia (E78.2)  Premature ventricular contractions (I49.3)    Past Medical History  History of Benign hypertensive heart disease (I11.9)  History of Counseling About Travel  History of Hepatitis B carrier (B18.1)  History of allergic rhinitis (Z87.09)  History of blepharitis (Z86.69)  History of conjunctivitis (Z86.69)  History of foreign travel (Z78.9)  History of hepatitis B (Z86.19)  History of influenza vaccination (Z92.29)    Surgical History  History of Inguinal Hernia Repair  History of rotator cuff repair  History of ventral hernia repair    Family History  Mother   Family history of Atherosclerotic coronary vascular disease  Family history of Cancer of unknown origin  Father   Family history of alcoholism (Z81.1)  Family history of essential hypertension (Z82.49)  Son   Family history of Recurrent kidney stones  Sister   Family history of sudden death (Z84.89)    Social History  Alcohol use (Z78.9)  Caffeine use (Z78.9)  Former smoker (Z87.891)    Vitals  Signs   Recorded: [id number] 02:08PM   Height: 5 ft 10 in  Weight: 191 lb 7.49 oz  BMI Calculated: 27.47  BSA Calculated: 2.05  Systolic: 113, LUE, Sitting  Diastolic: 48, LUE, Sitting  Temperature: 98.2 F  Heart Rate: 46  Respiration: 16  O2 Saturation: 99    Physical Exam  Constitutional: alert, in no acute distress and current vital signs reviewed.   Head and Face: atraumatic, no deformities, normocephalic, normal facies.   Neck: normal appearing neck, supple neck and no mass was seen. thyroid not enlarged and no thyroid nodules.   Lymphatic: no lymphadenopathy.   Chest: normal chest appearance.   Pulmonary: no respiratory distress, normal respiratory rate and effort and no accessory muscle use. breath sounds clear to auscultation bilaterally.    Cardiovascular: normal rate, no murmurs were heard, regular rhythm, normal S1 and normal S2.  Pulses: right carotid 2+, no right carotid bruit, left carotid 2+, no left carotid bruit, right femoral 2+, no right femoral bruit, left femoral 2+, no left femoral bruit, right posterior tibialis 2+, left posterior tibialis 2+, right dorsalis pedis 2+ and left dorsalis pedis 2+. Abdominal Aorta: normal size, no bruit. no pulse delay, no pulse discrepancy detected and full peripheral pulses. Lower extremities: no edema. edema was not present in the lower extremities.   Abdomen: soft, nontender, nondistended, normal bowel sounds and no abdominal mass. no hepatomegaly and no splenomegaly. no umbilical hernia was discovered.   Neurologic: cranial nerves grossly intact. normal DTRs. no sensory deficits noted. no coordination deficits. normal gait. muscle strength and tone were normal.   Psychiatric: oriented to person, oriented to place and oriented to time. alert and awake, interactive and mood/affect were appropriate. judgement not impaired. normal attention span. short term memory intact.   Skin, Hair, Nails: normal skin color and pigmentation and no rash. no foot ulcers and no skin ulcer was seen. normal skin turgor. no clubbing or cyanosis of the fingernails.      Immunizations  Influenza --- Series1: 22-Oct-2014  (66y); Series2: 04-Sep-2017  (69y)   PPSV --- Series1: 04-Sep-2017  (69y)   PCV 13, pneumococcal conjugate vaccine, 13 valent --- Series1: 04-Sep-2015  (67y)     Assessment  Encounter for preventive health examination (Z00.00)  Mixed hyperlipidemia (E78.2)  History of hepatitis B (Z86.19)  Hyperglycemia (R73.9)  Nonsustained paroxysmal ventricular tachycardia (I47.2)  BPH (benign prostatic hyperplasia) (N40.0)  Benign essential hypertension (I10)  Dyslipidemia with elevated low density lipoprotein (LDL) cholesterol and abnormally low  high density lipoprotein cholesterol (E78.5)    Plan  Vitamin D  (Ergocalciferol) 54765 UNIT Oral Capsule; TAKE 1 CAPSULE BY MOUTH  EVERY 10 DAYS  Plans:     Plan:   discussed with patient  reviewed Dr. Negrete's and Dr. Rosario's notes in [facility] from this year  diet/exercise discussed  will return for TDAP  had colonoscopy in 2011 so due in 2021  no symptoms  will do fasting blood work in March  check A1c today.   Medical compliance with plan discussed and risks of non-compliance reviewed.    Patient education completed on disease process, etiology & prognosis.    Patient expresses understanding of the plan.    Proper usage and side effects of medications reviewed & discussed.    Refer to orders.    Return to clinic as clinically indicated as discussed with patient who verbalized understanding of & agreement with the plan.      Signatures   Electronically signed by : Margaret Rogel CMA; Sep  4 2018  2:08PM CST    Electronically signed by : KENZIE CARMONA M.D.; Sep  4 2018  3:06PM CST    Electronically signed by : KENZIE CARMONA M.D.; Sep  4 2018  3:06PM CST    Electronically signed by : KENZIE CARMONA M.D.; Sep  4 2018  4:21PM CST     no

## 2018-12-03 ENCOUNTER — RX RENEWAL (OUTPATIENT)
Age: 83
End: 2018-12-03

## 2018-12-11 ENCOUNTER — RX RENEWAL (OUTPATIENT)
Age: 83
End: 2018-12-11

## 2019-02-04 ENCOUNTER — RX RENEWAL (OUTPATIENT)
Age: 84
End: 2019-02-04

## 2019-03-10 ENCOUNTER — TRANSCRIPTION ENCOUNTER (OUTPATIENT)
Age: 84
End: 2019-03-10

## 2019-03-13 PROBLEM — F03.90 UNSPECIFIED DEMENTIA WITHOUT BEHAVIORAL DISTURBANCE: Chronic | Status: ACTIVE | Noted: 2018-10-05

## 2019-03-19 ENCOUNTER — APPOINTMENT (OUTPATIENT)
Dept: CARDIOLOGY | Facility: CLINIC | Age: 84
End: 2019-03-19
Payer: MEDICARE

## 2019-03-19 DIAGNOSIS — R42 DIZZINESS AND GIDDINESS: ICD-10-CM

## 2019-03-19 PROCEDURE — 93224 XTRNL ECG REC UP TO 48 HRS: CPT

## 2019-03-25 ENCOUNTER — NON-APPOINTMENT (OUTPATIENT)
Age: 84
End: 2019-03-25

## 2019-03-28 PROBLEM — R42 DIZZINESS: Status: ACTIVE | Noted: 2018-08-27

## 2019-04-19 ENCOUNTER — RX RENEWAL (OUTPATIENT)
Age: 84
End: 2019-04-19

## 2019-05-07 ENCOUNTER — TRANSCRIPTION ENCOUNTER (OUTPATIENT)
Age: 84
End: 2019-05-07

## 2019-05-07 ENCOUNTER — RX RENEWAL (OUTPATIENT)
Age: 84
End: 2019-05-07

## 2019-06-04 ENCOUNTER — TRANSCRIPTION ENCOUNTER (OUTPATIENT)
Age: 84
End: 2019-06-04

## 2019-06-12 ENCOUNTER — TRANSCRIPTION ENCOUNTER (OUTPATIENT)
Age: 84
End: 2019-06-12

## 2019-06-17 ENCOUNTER — APPOINTMENT (OUTPATIENT)
Dept: CARDIOLOGY | Facility: CLINIC | Age: 84
End: 2019-06-17
Payer: MEDICARE

## 2019-06-17 VITALS
RESPIRATION RATE: 15 BRPM | HEART RATE: 62 BPM | BODY MASS INDEX: 27.16 KG/M2 | DIASTOLIC BLOOD PRESSURE: 78 MMHG | SYSTOLIC BLOOD PRESSURE: 129 MMHG | WEIGHT: 163 LBS | HEIGHT: 65 IN

## 2019-06-17 PROCEDURE — 99214 OFFICE O/P EST MOD 30 MIN: CPT

## 2019-07-12 NOTE — PROGRESS NOTE ADULT - PROBLEM SELECTOR PROBLEM 3
CKD (chronic kidney disease) stage 4, GFR 15-29 ml/min
CKD (chronic kidney disease) stage 4, GFR 15-29 ml/min
Coronary artery disease involving native coronary artery of native heart without angina pectoris
benztropine 0.5 milliGRAM(s) Oral at bedtime  gabapentin 300 milliGRAM(s) Oral three times a day  hydrOXYzine hydrochloride 100 milliGRAM(s) Oral at bedtime  lamoTRIgine 200 milliGRAM(s) Oral daily  lamoTRIgine 100 milliGRAM(s) Oral at bedtime  QUEtiapine 100 milliGRAM(s) Oral two times a day  risperiDONE   Tablet 3 milliGRAM(s) Oral two times a day  sertraline 50 milliGRAM(s) Oral daily

## 2019-07-16 ENCOUNTER — RX RENEWAL (OUTPATIENT)
Age: 84
End: 2019-07-16

## 2019-07-16 ENCOUNTER — TRANSCRIPTION ENCOUNTER (OUTPATIENT)
Age: 84
End: 2019-07-16

## 2019-07-18 ENCOUNTER — TRANSCRIPTION ENCOUNTER (OUTPATIENT)
Age: 84
End: 2019-07-18

## 2019-08-05 ENCOUNTER — RX RENEWAL (OUTPATIENT)
Age: 84
End: 2019-08-05

## 2019-10-14 ENCOUNTER — APPOINTMENT (OUTPATIENT)
Dept: CARDIOLOGY | Facility: CLINIC | Age: 84
End: 2019-10-14
Payer: MEDICARE

## 2019-10-14 ENCOUNTER — NON-APPOINTMENT (OUTPATIENT)
Age: 84
End: 2019-10-14

## 2019-10-14 VITALS
RESPIRATION RATE: 15 BRPM | DIASTOLIC BLOOD PRESSURE: 75 MMHG | HEIGHT: 65 IN | BODY MASS INDEX: 27.32 KG/M2 | HEART RATE: 69 BPM | WEIGHT: 164 LBS | SYSTOLIC BLOOD PRESSURE: 125 MMHG

## 2019-10-14 PROCEDURE — 99214 OFFICE O/P EST MOD 30 MIN: CPT

## 2019-10-14 PROCEDURE — 93000 ELECTROCARDIOGRAM COMPLETE: CPT

## 2019-10-28 ENCOUNTER — RX RENEWAL (OUTPATIENT)
Age: 84
End: 2019-10-28

## 2019-11-14 ENCOUNTER — RX RENEWAL (OUTPATIENT)
Age: 84
End: 2019-11-14

## 2019-11-27 ENCOUNTER — RX RENEWAL (OUTPATIENT)
Age: 84
End: 2019-11-27

## 2019-12-04 ENCOUNTER — APPOINTMENT (OUTPATIENT)
Dept: CARDIOLOGY | Facility: CLINIC | Age: 84
End: 2019-12-04
Payer: MEDICARE

## 2019-12-04 VITALS
RESPIRATION RATE: 16 BRPM | WEIGHT: 162 LBS | HEART RATE: 70 BPM | HEIGHT: 65 IN | DIASTOLIC BLOOD PRESSURE: 85 MMHG | SYSTOLIC BLOOD PRESSURE: 140 MMHG | BODY MASS INDEX: 26.99 KG/M2

## 2019-12-04 DIAGNOSIS — E03.9 HYPOTHYROIDISM, UNSPECIFIED: ICD-10-CM

## 2019-12-04 DIAGNOSIS — Z87.891 PERSONAL HISTORY OF NICOTINE DEPENDENCE: ICD-10-CM

## 2019-12-04 PROCEDURE — 99214 OFFICE O/P EST MOD 30 MIN: CPT

## 2019-12-04 RX ORDER — LISINOPRIL 5 MG/1
5 TABLET ORAL
Qty: 90 | Refills: 1 | Status: DISCONTINUED | COMMUNITY
Start: 2018-11-05 | End: 2019-12-04

## 2019-12-09 PROBLEM — E03.9 HYPOTHYROIDISM: Status: ACTIVE | Noted: 2019-12-09

## 2019-12-09 RX ORDER — SODIUM BICARBONATE 650 MG/1
650 TABLET ORAL DAILY
Refills: 0 | Status: ACTIVE | COMMUNITY
Start: 2017-11-08

## 2019-12-09 RX ORDER — AMLODIPINE BESYLATE 5 MG/1
5 TABLET ORAL
Qty: 90 | Refills: 1 | Status: ACTIVE | COMMUNITY
Start: 2018-06-10

## 2020-02-11 RX ORDER — LISINOPRIL 10 MG/1
10 TABLET ORAL DAILY
Qty: 90 | Refills: 1 | Status: ACTIVE | COMMUNITY
Start: 2019-12-04 | End: 1900-01-01

## 2020-03-04 ENCOUNTER — APPOINTMENT (OUTPATIENT)
Dept: CARDIOLOGY | Facility: CLINIC | Age: 85
End: 2020-03-04
Payer: MEDICARE

## 2020-03-04 VITALS
DIASTOLIC BLOOD PRESSURE: 62 MMHG | WEIGHT: 164 LBS | HEART RATE: 60 BPM | BODY MASS INDEX: 27.32 KG/M2 | RESPIRATION RATE: 15 BRPM | HEIGHT: 65 IN | SYSTOLIC BLOOD PRESSURE: 138 MMHG

## 2020-03-04 DIAGNOSIS — I25.10 ATHEROSCLEROTIC HEART DISEASE OF NATIVE CORONARY ARTERY W/OUT ANGINA PECTORIS: ICD-10-CM

## 2020-03-04 PROCEDURE — 99214 OFFICE O/P EST MOD 30 MIN: CPT

## 2020-03-04 RX ORDER — ROSUVASTATIN CALCIUM 40 MG/1
40 TABLET, FILM COATED ORAL DAILY
Qty: 90 | Refills: 3 | Status: ACTIVE | COMMUNITY
Start: 2020-03-04 | End: 1900-01-01

## 2020-08-18 ENCOUNTER — TRANSCRIPTION ENCOUNTER (OUTPATIENT)
Age: 85
End: 2020-08-18

## 2020-09-02 ENCOUNTER — APPOINTMENT (OUTPATIENT)
Dept: CARDIOLOGY | Facility: CLINIC | Age: 85
End: 2020-09-02
Payer: MEDICARE

## 2020-09-02 ENCOUNTER — NON-APPOINTMENT (OUTPATIENT)
Age: 85
End: 2020-09-02

## 2020-09-02 VITALS
BODY MASS INDEX: 27.99 KG/M2 | DIASTOLIC BLOOD PRESSURE: 80 MMHG | SYSTOLIC BLOOD PRESSURE: 130 MMHG | HEIGHT: 65 IN | RESPIRATION RATE: 15 BRPM | HEART RATE: 56 BPM | WEIGHT: 168 LBS

## 2020-09-02 DIAGNOSIS — I65.29 OCCLUSION AND STENOSIS OF UNSPECIFIED CAROTID ARTERY: ICD-10-CM

## 2020-09-02 DIAGNOSIS — R09.89 OTHER SPECIFIED SYMPTOMS AND SIGNS INVOLVING THE CIRCULATORY AND RESPIRATORY SYSTEMS: ICD-10-CM

## 2020-09-02 PROCEDURE — 93880 EXTRACRANIAL BILAT STUDY: CPT

## 2020-09-02 PROCEDURE — 93306 TTE W/DOPPLER COMPLETE: CPT

## 2020-09-02 PROCEDURE — 93978 VASCULAR STUDY: CPT

## 2020-09-02 PROCEDURE — 99214 OFFICE O/P EST MOD 30 MIN: CPT

## 2020-09-02 PROCEDURE — 93000 ELECTROCARDIOGRAM COMPLETE: CPT

## 2020-09-02 NOTE — HISTORY OF PRESENT ILLNESS
[FreeTextEntry1] : 84-year-old man with past medical history significant for coronary artery disease s/p PCI, atrial fibrillation, non-insulin-dependent diabetes mellitus, renal insufficiency, early Alzheimer's disease, who comes in for followup cardiac evaluation.  \par \par He feels well and has no cardiac symptoms. \par \par Plan: \par - Given CAD and LDL of 74, will increase rosuvastatin from 20mg to 40mg

## 2020-09-02 NOTE — PHYSICAL EXAM
[General Appearance - Well Developed] : well developed [Normal Appearance] : normal appearance [General Appearance - Well Nourished] : well nourished [Normal Conjunctiva] : the conjunctiva exhibited no abnormalities [Normal Oral Mucosa] : normal oral mucosa [Normal Jugular Venous A Waves Present] : normal jugular venous A waves present [Normal Jugular Venous V Waves Present] : normal jugular venous V waves present [No Jugular Venous Cagle A Waves] : no jugular venous cagle A waves [Respiration, Rhythm And Depth] : normal respiratory rhythm and effort [Exaggerated Use Of Accessory Muscles For Inspiration] : no accessory muscle use [Auscultation Breath Sounds / Voice Sounds] : lungs were clear to auscultation bilaterally [Bowel Sounds] : normal bowel sounds [Abnormal Walk] : normal gait [Nail Clubbing] : no clubbing of the fingernails [Abdomen Soft] : soft [Skin Color & Pigmentation] : normal skin color and pigmentation [Cyanosis, Localized] : no localized cyanosis [] : no rash [Oriented To Time, Place, And Person] : oriented to person, place, and time [Skin Turgor] : normal skin turgor [Mood] : the mood was normal [Affect] : the affect was normal [5th Left ICS - MCL] : palpated at the 5th LICS in the midclavicular line [No Anxiety] : not feeling anxious [No Precordial Heave] : no precordial heave was noted [Normal] : normal [Rhythm Regular] : regular [Normal Rate] : normal [Normal S2] : normal S2 [Normal S1] : normal S1 [No Gallop] : no gallop heard [S3] : no S3 [S4] : no S4 [Click] : no click [Pericardial Rub] : no pericardial rub [No Murmur] : no murmurs heard [II] : a grade 2 [I] : a grade 1 [Right Carotid Bruit] : no bruit heard over the right carotid [Left Carotid Bruit] : no bruit heard over the left carotid [Right Femoral Bruit] : no bruit heard over the right femoral artery [Left Femoral Bruit] : no bruit heard over the left femoral artery [No Abnormalities] : the abdominal aorta was not enlarged and no bruit was heard [2+] : left 2+ [No Pitting Edema] : no pitting edema present

## 2020-09-02 NOTE — REASON FOR VISIT
[Follow-Up - Clinic] : a clinic follow-up of [Coronary Artery Disease] : coronary artery disease [Hyperlipidemia] : hyperlipidemia [Mitral Regurgitation] : mitral regurgitation [FreeTextEntry1] : 85-year-old man with past medical history significant for coronary artery disease s/p PCI, atrial fibrillation, non-insulin-dependent diabetes mellitus, renal insufficiency, early Alzheimer's disease, who comes in for followup cardiac evaluation.   [Hypertension] : hypertension [Spouse] : spouse

## 2020-09-02 NOTE — DISCUSSION/SUMMARY
unknown
[FreeTextEntry1] : This is an 85-year-old man with past medical history significant for coronary artery disease,s/p stent, atrial fibrillation, non-insulin-dependent diabetes mellitus, renal insufficiency, early Alzheimer's disease, who comes in for follow up cardiac evaluation. \par He denies chest pain, shortness of breath, dizziness or syncope.\par Electrocardiogram done September 2, 2020 demonstrates atrial fibrillation with a moderate ventricular response, nonspecific ST wave changes.\par Blood work done June 24, 2020 demonstrates creatinine of 2.74, GFR of 21 cc/min, hemoglobin A1c of 5.9, cholesterol 134, triglycerides 72, HDL of 52, LDL calculated of 68 mg/dL.  The patient will continue on his usual medications.  He is also was anemic at that time with a hemoglobin of 10.8 and hematocrit of 32.\par He will follow-up with his hematologist as well as his nephrologist.  He may benefit from Procrit injections.\par He continues to walk with the assistance of a walker.  He will also follow-up with his neurologist as needed.\par Electrocardiogram done October 14, 2019 demonstrated normal sinus rhythm rate 62 beats per minute otherwise remarkable for nonspecific ST wave changes.\par He will remain on his usual medications including Xarelto in the evening for anticoagulation.\par The patient is off his metoprolol tartrate 12.5 mg twice a day with an improvement in his heart rate.\par \par \par Echo 4/4/2018-- ejection fraction of 55-60%. He has mild mitral valve regurgitation, mild tricuspid valve regurgitation, mild pulmonic regurgitation, left ventricular hypertrophy, and left atrial enlargement.\par \par Cardiac catheterization September 8, 2016 which demonstrated a 30% lesion the distal left main artery, 50% stenosis in the ostial circumflex artery, 100% stenosis the right coronary artery, and no significant restenosis in the left anterior descending artery. The patient has been stable on medical therapy, up until the last few weeks when his been complaining of exertional chest pressure.\par \par The patient had a cardiac catheterization May 15, 2014 and had a stent placed to the mid LAD, and proximal LAD.\par \par

## 2020-09-15 RX ORDER — PHENOBARBITAL, HYOSCYAMINE SULFATE, ATROPINE SULFATE, SCOPOLAMINE HYDROBROMIDE 16.2; .1037; .0194; .0065 MG/1; MG/1; MG/1; MG/1
16.2 TABLET ORAL
Refills: 0 | Status: COMPLETED | COMMUNITY
Start: 2019-12-09 | End: 2020-09-15

## 2020-10-04 PROBLEM — I65.29 CAROTID ARTERY PLAQUE: Status: ACTIVE | Noted: 2020-10-04

## 2020-10-04 PROBLEM — R09.89 CAROTID BRUIT: Status: ACTIVE | Noted: 2020-10-04

## 2020-10-05 NOTE — DISCHARGE NOTE ADULT - NS AS ACTIVITY OBS
Telephone call to patient. Left voicemail of nature of call with request for return phone call. Call back number was provided. Do not drive or operate machinery/Showering allowed/Walking-Indoors allowed/No Heavy lifting/straining/Walking-Outdoors allowed/Stairs allowed/Do not make important decisions

## 2020-10-26 ENCOUNTER — RX RENEWAL (OUTPATIENT)
Age: 85
End: 2020-10-26

## 2020-10-29 ENCOUNTER — TRANSCRIPTION ENCOUNTER (OUTPATIENT)
Age: 85
End: 2020-10-29

## 2020-12-07 ENCOUNTER — TRANSCRIPTION ENCOUNTER (OUTPATIENT)
Age: 85
End: 2020-12-07

## 2020-12-16 ENCOUNTER — APPOINTMENT (OUTPATIENT)
Dept: CARDIOLOGY | Facility: CLINIC | Age: 85
End: 2020-12-16
Payer: MEDICARE

## 2020-12-16 VITALS
WEIGHT: 161 LBS | DIASTOLIC BLOOD PRESSURE: 70 MMHG | RESPIRATION RATE: 16 BRPM | SYSTOLIC BLOOD PRESSURE: 128 MMHG | BODY MASS INDEX: 26.82 KG/M2 | OXYGEN SATURATION: 98 % | TEMPERATURE: 98 F | HEIGHT: 65 IN | HEART RATE: 57 BPM

## 2020-12-16 DIAGNOSIS — I25.10 ATHEROSCLEROTIC HEART DISEASE OF NATIVE CORONARY ARTERY W/OUT ANGINA PECTORIS: ICD-10-CM

## 2020-12-16 DIAGNOSIS — I73.9 PERIPHERAL VASCULAR DISEASE, UNSPECIFIED: ICD-10-CM

## 2020-12-16 DIAGNOSIS — I34.0 NONRHEUMATIC MITRAL (VALVE) INSUFFICIENCY: ICD-10-CM

## 2020-12-16 DIAGNOSIS — R00.2 PALPITATIONS: ICD-10-CM

## 2020-12-16 DIAGNOSIS — R00.1 BRADYCARDIA, UNSPECIFIED: ICD-10-CM

## 2020-12-16 DIAGNOSIS — E11.9 TYPE 2 DIABETES MELLITUS W/OUT COMPLICATIONS: ICD-10-CM

## 2020-12-16 DIAGNOSIS — E78.5 HYPERLIPIDEMIA, UNSPECIFIED: ICD-10-CM

## 2020-12-16 DIAGNOSIS — I71.4 ABDOMINAL AORTIC ANEURYSM, W/OUT RUPTURE: ICD-10-CM

## 2020-12-16 DIAGNOSIS — Z79.01 LONG TERM (CURRENT) USE OF ANTICOAGULANTS: ICD-10-CM

## 2020-12-16 DIAGNOSIS — Z87.898 PERSONAL HISTORY OF OTHER SPECIFIED CONDITIONS: ICD-10-CM

## 2020-12-16 DIAGNOSIS — Z95.5 PRESENCE OF CORONARY ANGIOPLASTY IMPLANT AND GRAFT: ICD-10-CM

## 2020-12-16 DIAGNOSIS — I48.91 UNSPECIFIED ATRIAL FIBRILLATION: ICD-10-CM

## 2020-12-16 DIAGNOSIS — I10 ESSENTIAL (PRIMARY) HYPERTENSION: ICD-10-CM

## 2020-12-16 PROCEDURE — 99214 OFFICE O/P EST MOD 30 MIN: CPT

## 2021-03-24 ENCOUNTER — APPOINTMENT (OUTPATIENT)
Dept: CARDIOLOGY | Facility: CLINIC | Age: 86
End: 2021-03-24

## 2021-10-06 PROBLEM — I10 ESSENTIAL HYPERTENSION: Status: ACTIVE | Noted: 2018-04-04

## 2022-06-30 NOTE — H&P CARDIOLOGY - DATE:
29-Aug-2018 Purse String (Intermediate) Text: Given the location of the defect and the characteristics of the surrounding skin a purse string intermediate closure was deemed most appropriate.  Undermining was performed circumfirentially around the surgical defect.  A purse string suture was then placed and tightened.

## 2024-03-11 NOTE — BRIEF OPERATIVE NOTE - OPERATION/FINDINGS
Medicare Wellness Visit  Plan for Preventive Care    A good way for you to stay healthy is to use preventive care.  Medicare covers many services that can help you stay healthy.* The goal of these services is to find any health problems as quickly as possible. Finding problems early can help make them easier to treat.  Your personal plan below lists the services you may need and when they are due.      Health Maintenance Summary     Lung Cancer Screening (Yearly)  Overdue - never done    Traditional Medicare- Medicare Wellness Visit (Yearly)  Due since 3/9/2024    Colorectal Cancer Screen- (Cologuard - Every 3 Years)  Next due on 5/13/2024    Depression Screening (Yearly)  Next due on 11/22/2024    DTaP/Tdap/Td Vaccine (6 - Td or Tdap)  Next due on 4/30/2026    Hepatitis C Screening   Completed    Shingles Vaccine   Completed    Pneumococcal Vaccine 0-64   Completed    Influenza Vaccine   Completed    COVID-19 Vaccine   Completed    Meningococcal Vaccine   Aged Out    Hepatitis B Vaccine (For Physician/[facility] Discussion)   Aged Out    HPV Vaccine   Aged Out           Preventive Care for Women and Men    Heart Screenings (Cardiovascular):  Blood tests are used to check your cholesterol, lipid and triglyceride levels. High levels can increase your risk for heart disease and stroke. High levels can be treated with medications, diet and exercise. Lowering your levels can help keep your heart and blood vessels healthy.  Your provider will order these tests if they are needed.    An ultrasound is done to see if you have an abdominal aortic aneurysm (AAA).  This is an enlargement of one of the main blood vessels that delivers blood to the body.   In the [place], 9,000 deaths are caused by AAA.  You may not even know you have this problem and as many as 1 in 3 people will have a serious problem if it is not treated.  Early diagnosis allows for more effective treatment and cure.  If you have a family history of AAA or  are a male age 65-75 who has smoked, you are at higher risk of an AAA.  Your provider can order this test, if needed.    Colorectal Screening:  There are many tests that are used to check for cancer of your colon and rectum. You and your provider should discuss what test is best for you and when to have it done.  Options include:  Screening Colonoscopy: exam of the entire colon, seen through a flexible lighted tube.  Flexible Sigmoidoscopy: exam of the last third (sigmoid portion) of the colon and rectum, seen through a flexible lighted tube.  Cologuard DNA stool test: a sample of your stool is used to screen for cancer and unseen blood in your stool.  Fecal Occult Blood Test: a sample of your stool is studied to find any unseen blood    Flu Shot:  An immunization that helps to prevent influenza (the flu). You should get this every year. The best time to get the shot is in the fall.    Pneumococcal Shot:  Vaccines help prevent pneumococcal disease, which is any type of illness caused by Streptococcus pneumoniae bacteria. There are two kinds of pneumococcal vaccines available in the [place]:   Pneumococcal conjugate vaccines (PCV20 or [id number]®)  Pneumococcal polysaccharide vaccine (PPSV23 or [id number]®)  For those who have never received any pneumococcal conjugate vaccine, CDC recommends PVC20 for adults 65 years or older and adults 19 through 64 years old with certain medical conditions or risk factors.   For those who have previously received PCV13, this should be followed by a dose of PPSV23.     Hepatitis B Shot:  An immunization that helps to protect people from getting Hepatitis B. Hepatitis B is a virus that spreads through contact with infected blood or body fluids. Many people with the virus do not have symptoms.  The virus can lead to serious problems, such as liver disease. Some people are at higher risk than others. Your doctor will tell you if you need this shot.     Diabetes Screening:  A test  to measure sugar (glucose) in your blood is called a fasting blood sugar. Fasting means you cannot have food or drink for at least 8 hours before the test. This test can detect diabetes long before you may notice symptoms.    Glaucoma Screening:  Glaucoma screening is performed by your eye doctor. The test measures the fluid pressure inside your eyes to determine if you have glaucoma.     Hepatitis C Screening:  A blood test to see if you have the hepatitis C virus.  Hepatitis C attacks the liver and is a major cause of chronic liver disease.  Medicare will cover a single screening for all adults born between 1945 & 1965, or high risk patients (people who have injected illegal drugs or people who have had blood transfusions).  High risk patients who continue to inject illegal drugs can be screened for Hepatitis C every year.    Smoking and Tobacco-Use Cessation Counseling:  Tobacco is the single greatest cause of disease and early death in our country today. Medication and counseling together can increase a person’s chance of quitting for good.   Medicare covers two quitting attempts per year, with four counseling sessions per attempt (eight sessions in a 12 month period)    Preventive Screening tests for Women    Screening Mammograms and Breast Exams:  An x-ray of your breasts to check for breast cancer before you or your doctor may be able to feel it.  If breast cancer is found early it can usually be treated with success.    Pelvic Exams and Pap Tests:  An exam to check for cervical and vaginal cancer. A Pap test is a lab test in which cells are taken from your cervix and sent to the lab to look for signs of cervical cancer. If cancer of the cervix is found early, chances for a cure are good. Testing can generally end at age 65, or if a woman has a hysterectomy for a benign condition. Your provider may recommend more frequent testing if certain abnormal results are found.    Bone Mass Measurements:  A painless  x-ray of your bone density to see if you are at risk for a broken bone. Bone density refers to the thickness of bones or how tightly the bone tissue is packed.    Preventive Screening tests for Men    Prostate Screening:  Should you have a prostate cancer test (PSA)?  It is up to you to decide if you want a prostate cancer test. Talk to your clinician to find out if the test is right for you.  Things for you to consider and talk about should include:  Benefits and harms of the test  Your family history  How your race/ethnicity may influence the test  If the test may impact other medical conditions you have  Your values on screenings and treatments    *Medicare pays for many preventive services to keep you healthy. For some of these services, you might have to pay a deductible, coinsurance, and / or copayment.  The amounts vary depending on the type of services you need and the kind of Medicare health plan you have.    For further details on screenings offered by Medicare please visit: https://www.medicare.gov/coverage/preventive-screening-services      Removal of spinal hardware   L4-L5 laminectomy  L3-S1 instrumentation and fusion  Iliac crest bone graft

## 2024-09-26 NOTE — ED PROVIDER NOTE - PSYCHIATRIC, MLM
Mother Alert and oriented to person, place, time/situation. normal mood and affect. no apparent risk to self or others.